# Patient Record
Sex: FEMALE | Race: BLACK OR AFRICAN AMERICAN | Employment: UNEMPLOYED | URBAN - METROPOLITAN AREA
[De-identification: names, ages, dates, MRNs, and addresses within clinical notes are randomized per-mention and may not be internally consistent; named-entity substitution may affect disease eponyms.]

---

## 2017-02-16 ENCOUNTER — HOSPITAL ENCOUNTER (EMERGENCY)
Age: 42
Discharge: HOME | End: 2017-02-16
Attending: EMERGENCY MEDICINE
Payer: MEDICAID

## 2017-02-16 VITALS
TEMPERATURE: 98.2 F | OXYGEN SATURATION: 100 % | BODY MASS INDEX: 19.37 KG/M2 | RESPIRATION RATE: 16 BRPM | HEIGHT: 72 IN | DIASTOLIC BLOOD PRESSURE: 101 MMHG | HEART RATE: 60 BPM | WEIGHT: 143 LBS | SYSTOLIC BLOOD PRESSURE: 156 MMHG

## 2017-02-16 DIAGNOSIS — G89.18 ACUTE POST-OPERATIVE PAIN: Primary | ICD-10-CM

## 2017-02-16 DIAGNOSIS — Z51.89 VISIT FOR WOUND CHECK: ICD-10-CM

## 2017-02-16 PROCEDURE — 74011250637 HC RX REV CODE- 250/637: Performed by: EMERGENCY MEDICINE

## 2017-02-16 PROCEDURE — 99283 EMERGENCY DEPT VISIT LOW MDM: CPT

## 2017-02-16 PROCEDURE — 96372 THER/PROPH/DIAG INJ SC/IM: CPT

## 2017-02-16 PROCEDURE — 74011250636 HC RX REV CODE- 250/636: Performed by: EMERGENCY MEDICINE

## 2017-02-16 RX ORDER — PROMETHAZINE HYDROCHLORIDE 25 MG/1
25 TABLET ORAL
COMMUNITY
End: 2018-07-23

## 2017-02-16 RX ORDER — OXYCODONE AND ACETAMINOPHEN 10; 325 MG/1; MG/1
TABLET ORAL
Qty: 25 TAB | Refills: 0 | Status: SHIPPED | OUTPATIENT
Start: 2017-02-16 | End: 2018-07-23

## 2017-02-16 RX ORDER — ONDANSETRON 4 MG/1
4 TABLET, ORALLY DISINTEGRATING ORAL
Status: COMPLETED | OUTPATIENT
Start: 2017-02-16 | End: 2017-02-16

## 2017-02-16 RX ORDER — HYDROMORPHONE HYDROCHLORIDE 1 MG/ML
1 INJECTION, SOLUTION INTRAMUSCULAR; INTRAVENOUS; SUBCUTANEOUS
Status: COMPLETED | OUTPATIENT
Start: 2017-02-16 | End: 2017-02-16

## 2017-02-16 RX ORDER — OXYCODONE AND ACETAMINOPHEN 5; 325 MG/1; MG/1
2 TABLET ORAL
COMMUNITY
End: 2017-02-16

## 2017-02-16 RX ADMIN — HYDROMORPHONE HYDROCHLORIDE 1 MG: 1 INJECTION, SOLUTION INTRAMUSCULAR; INTRAVENOUS; SUBCUTANEOUS at 03:13

## 2017-02-16 RX ADMIN — ONDANSETRON 4 MG: 4 TABLET, ORALLY DISINTEGRATING ORAL at 03:13

## 2017-02-16 NOTE — ED NOTES
I have reviewed discharge instructions with the patient. The patient verbalized understanding. Patient armband removed and shredded  Reviewed and verified discharge instructions with patient, discharged ambulatory on steady gait in with improved pain with father driving patient home.

## 2017-02-16 NOTE — ED PROVIDER NOTES
HPI Comments: 3:01 AM   Porsha Hayden is a 37 y.o. female presenting to the ED C/O post op complication from fusion surgery on her left third digit yesterday afternoon. Surgery was done by Dr. Herminia Gilbert and he prescribed her Percocet for pain control. Pt reports the Percocet is not helping with her pain tonight. This is the second surgery on her left third digit. Pt denies any other Sx or complaints. Patient is a 37 y.o. female presenting with post-operative complications. The history is provided by the patient. No  was used. Post OP Complication   This is a new problem. The current episode started yesterday. The problem occurs constantly. The problem has not changed since onset. Treatments tried: Percocet. The treatment provided no relief. Written by ONOFRE Markham, as dictated by Nikhil Kiser. Ochoa Shelby MD    Past Medical History:   Diagnosis Date    Ectopic pregnancy        Past Surgical History:   Procedure Laterality Date    Hx gyn       eptopic    Hx other surgical       foot         History reviewed. No pertinent family history. Social History     Social History    Marital status: SINGLE     Spouse name: N/A    Number of children: N/A    Years of education: N/A     Occupational History    Not on file. Social History Main Topics    Smoking status: Current Every Day Smoker     Packs/day: 0.25    Smokeless tobacco: Not on file    Alcohol use No    Drug use: No    Sexual activity: Not on file     Other Topics Concern    Not on file     Social History Narrative         ALLERGIES: Naproxen; Pcn [penicillins]; and Tramadol    Review of Systems   Musculoskeletal: Positive for arthralgias (Left third digit pain) and myalgias (Left third digit pain). All other systems reviewed and are negative.       Vitals:    02/16/17 0223   BP: (!) 156/101   Pulse: 60   Resp: 16   Temp: 98.2 °F (36.8 °C)   SpO2: 100%   Weight: 64.9 kg (143 lb)   Height: 6' (1.829 m) Physical Exam   Constitutional: She is oriented to person, place, and time. She appears well-developed and well-nourished. Non-toxic appearance. No distress. Uncomfortable appearing   HENT:   Head: Normocephalic and atraumatic. Right Ear: External ear normal.   Left Ear: External ear normal.   Mouth/Throat: Oropharynx is clear and moist. No oropharyngeal exudate. Eyes: Conjunctivae and EOM are normal. Pupils are equal, round, and reactive to light. No scleral icterus. No pallor   Neck: Normal range of motion. Neck supple. No JVD present. No tracheal deviation present. No thyromegaly present. Cardiovascular: Normal rate, regular rhythm and normal heart sounds. Pulmonary/Chest: Effort normal and breath sounds normal. No stridor. No respiratory distress. Abdominal: Soft. Bowel sounds are normal. She exhibits no distension. There is no tenderness. There is no rebound and no guarding. Musculoskeletal: Normal range of motion. She exhibits no edema or tenderness. No soft tissue injuries   Lymphadenopathy:     She has no cervical adenopathy. Neurological: She is alert and oriented to person, place, and time. She has normal reflexes. No cranial nerve deficit. Coordination normal.   Skin: Skin is warm and dry. No rash noted. She is not diaphoretic. No erythema. Tattoos on face. Left third digit  with edema but without erythema. No purulence. No bleeding. Good perfusion. Cap refill is instant. Sutures intact. Psychiatric: She has a normal mood and affect. Her behavior is normal. Judgment and thought content normal.   Nursing note and vitals reviewed. RESULTS:    No orders to display        Labs Reviewed - No data to display    No results found for this or any previous visit (from the past 12 hour(s)). MDM  Number of Diagnoses or Management Options  Acute post-operative pain:   Visit for wound check:   Diagnosis management comments: DDx: Doubtful for overt bleeding.  Greatest concern is for new ischemic event. Wound check reveals a healthy appearing post op finger, probably wrapped too tightly as she had immediate relief after removing her dressing      ED Course     Medications   HYDROmorphone (PF) (DILAUDID) injection 1 mg (1 mg IntraMUSCular Given 2/16/17 0313)   ondansetron (ZOFRAN ODT) tablet 4 mg (4 mg Oral Given 2/16/17 0313)      Procedures  PROGRESS NOTE:  3:01 AM  Initial assessment performed. Written by Regan Eisenmenger, ED Scribe, as dictated by Diya Monsalve. Ramirez Cortez MD     DISCHARGE NOTE:  3:58 AM   Maribell Krishna  results have been reviewed with her. She has been counseled regarding her diagnosis, treatment, and plan. She verbally conveys understanding and agreement of the signs, symptoms, diagnosis, treatment and prognosis and additionally agrees to follow up as discussed. She also agrees with the care-plan and conveys that all of her questions have been answered. I have also provided discharge instructions for her that include: educational information regarding their diagnosis and treatment, and list of reasons why they would want to return to the ED prior to their follow-up appointment, should her condition change. The patient and/or family has been provided with education for proper Emergency Department utilization. CLINICAL IMPRESSION:    1. Acute post-operative pain    2.  Visit for wound check        PLAN: DISCHARGE HOME    Follow-up Information     Follow up With Details Comments [address] Schedule an appointment as soon as possible for a visit in 1 day [facility] [address]  [facility] [address], [place] Schedule an appointment as soon as possible for a visit in 2 days Follow up with your primary care physician [address]      THE [facility] EMERGENCY DEPT Go to As needed, If symptoms worsen [address] [id number]  [phone] Current Discharge Medication List      CONTINUE these medications which have NOT CHANGED    Details   oxyCODONE-acetaminophen (PERCOCET) 5-325 mg per tablet Take 2 Tabs by mouth every four (4) hours as needed for Pain. promethazine (PHENERGAN) 25 mg tablet Take 25 mg by mouth every six (6) hours as needed for Nausea. ATTESTATIONS:  This note is prepared by Dylon Orourke, acting as Scribe for Ivy Brambila. Hayden Delgado MD .    Ivy Brambila. Hayden Delgado MD : The scribe's documentation has been prepared under my direction and personally reviewed by me in its entirety. I confirm that the note above accurately reflects all work, treatment, procedures, and medical decision making performed by me.

## 2017-02-16 NOTE — ED NOTES
Pt with surgery on Monday to have her left middle finger fused, dressing in dry and intact, no swelling to left hand, no redness extending from left middle finger, pt is having pain that is not controlled by prescribed Percocet.

## 2017-02-16 NOTE — ED TRIAGE NOTES
States had fusion surgery on left 3rd finger today at [facility] by Dr. Teetee Guerrero, states pain unbearable tonight, Percocet not controlling pain.

## 2017-02-16 NOTE — DISCHARGE INSTRUCTIONS
Acute Pain After Surgery: Care Instructions  Your Care Instructions  It's common to have some pain after surgery. Pain doesn't mean that something is wrong or that the surgery didn't go well. But when the pain is severe, it's important to work with your doctor to manage it. It's also important to be aware of a few facts about pain and pain medicine. · You are the only person who knows what your pain feels like. So be sure to tell your doctor when you are in pain or when the pain changes. Then he or she will know how to adjust your medicines. · Pain is often easier to control right after it starts. So it may be better to take regular doses of pain medicine and not wait until the pain gets bad. · Medicine can help control pain. But this doesn't mean you'll have no pain. Medicine works to keep the pain at a level you can live with. With time, you will feel better. Follow-up care is a key part of your treatment and safety. Be sure to make and go to all appointments, and call your doctor if you are having problems. It's also a good idea to know your test results and keep a list of the medicines you take. How can you care for yourself at home? · Be safe with medicines. Read and follow all instructions on the label. ¨ If the doctor gave you a prescription medicine for pain, take it as prescribed. ¨ If you are not taking a prescription pain medicine, ask your doctor if you can take an over-the-counter medicine. · If you take an over-the-counter pain medicine, such as acetaminophen (Tylenol), ibuprofen (Advil, Motrin), or naproxen (Aleve), read and follow all instructions on the label. · Do not take two or more pain medicines at the same time unless the doctor told you to. · Do not drink alcohol while you are taking pain medicines. · Try to walk each day if your doctor recommends it. Start by walking a little more than you did the day before. Bit by bit, increase the amount you walk.  Walking increases blood flow. It also helps prevent pneumonia and constipation. · To prevent constipation from narcotic pain medicines:  ¨ Talk to your doctor about a laxative. ¨ Include fruits, vegetables, beans, and whole grains in your diet each day. These foods are high in fiber. ¨ Drink plenty of fluids, enough so that your urine is light yellow or clear like water. Drink water, fruit juice, or other drinks that do not contain caffeine or alcohol. If you have kidney, heart, or liver disease and have to limit fluids, talk with your doctor before you increase the amount of fluids you drink. ¨ Take a fiber supplement, such as Citrucel or Metamucil, every day if needed. Read and follow all instructions on the label. If you take pain medicine for more than a few days, talk to your doctor before you take fiber. When should you call for help? Call your doctor now or seek immediate medical care if:  · Your pain gets worse. · Your pain is not controlled by medicine. Watch closely for changes in your health, and be sure to contact your doctor if you have any problems. Where can you learn more? Go to http://santino-nissa.info/. Enter (70) 474-076 in the search box to learn more about \"Acute Pain After Surgery: Care Instructions. \"  Current as of: May 27, 2016  Content Version: 11.1  © 7336-8569 Healthwise, Incorporated. Care instructions adapted under license by [employer] (which disclaims liability or warranty for this information). If you have questions about a medical condition or this instruction, always ask your healthcare professional. [place] [address] any warranty or liability for your use of this information.

## 2018-07-23 ENCOUNTER — HOSPITAL ENCOUNTER (EMERGENCY)
Age: 43
Discharge: HOME | End: 2018-07-23
Attending: EMERGENCY MEDICINE
Payer: MEDICAID

## 2018-07-23 VITALS
BODY MASS INDEX: 20.32 KG/M2 | TEMPERATURE: 98 F | HEIGHT: 72 IN | OXYGEN SATURATION: 100 % | WEIGHT: 150 LBS | SYSTOLIC BLOOD PRESSURE: 110 MMHG | DIASTOLIC BLOOD PRESSURE: 93 MMHG | HEART RATE: 82 BPM | RESPIRATION RATE: 18 BRPM

## 2018-07-23 DIAGNOSIS — H00.012 HORDEOLUM EXTERNUM OF RIGHT LOWER EYELID: Primary | ICD-10-CM

## 2018-07-23 PROCEDURE — 74011000250 HC RX REV CODE- 250: Performed by: EMERGENCY MEDICINE

## 2018-07-23 PROCEDURE — 99283 EMERGENCY DEPT VISIT LOW MDM: CPT

## 2018-07-23 RX ORDER — TETRACAINE HYDROCHLORIDE 5 MG/ML
2 SOLUTION OPHTHALMIC
Status: COMPLETED | OUTPATIENT
Start: 2018-07-23 | End: 2018-07-23

## 2018-07-23 RX ORDER — DOXYCYCLINE 100 MG/1
100 CAPSULE ORAL 2 TIMES DAILY
Qty: 14 CAP | Refills: 0 | Status: SHIPPED | OUTPATIENT
Start: 2018-07-23 | End: 2018-07-30

## 2018-07-23 RX ORDER — HYDROCODONE BITARTRATE AND ACETAMINOPHEN 5; 325 MG/1; MG/1
TABLET ORAL
Qty: 8 TAB | Refills: 0 | Status: SHIPPED | OUTPATIENT
Start: 2018-07-23 | End: 2019-09-30

## 2018-07-23 RX ADMIN — TETRACAINE HYDROCHLORIDE 2 DROP: 5 SOLUTION OPHTHALMIC at 03:46

## 2018-07-23 NOTE — ED NOTES
Pt presents to ED saying, \"I have a stye. \"  This RN noticing a small redden spot on her bottom eyelid, slight swelling, no drng. Pt saying the area is painful. No other complaints being voiced.

## 2018-07-23 NOTE — DISCHARGE INSTRUCTIONS
Styes and Chalazia: Care Instructions  Your Care Instructions    Styes and chalazia (say \"oso-LTN-fgg-uh\") are both conditions that can cause swelling of the eyelid. A stye is an infection in the root of an eyelash. The infection causes a tender red lump on the edge of the eyelid. The infection can spread until the whole eyelid becomes red and inflamed. Styes usually break open, and a tiny amount of pus drains. They usually clear up on their own in about a week, but they sometimes need treatment with antibiotics. A chalazion is a lump or cyst in the eyelid (chalazion is singular; chalazia is plural). It is caused by swelling and inflammation of deep oil glands inside the eyelid. Chalazia are usually not infected. They can take a few months to heal.  If a chalazion becomes more swollen and painful or does not go away, you may need to have it drained by your doctor. Follow-up care is a key part of your treatment and safety. Be sure to make and go to all appointments, and call your doctor if you are having problems. It's also a good idea to know your test results and keep a list of the medicines you take. How can you care for yourself at home? · Do not rub your eyes. Do not squeeze or try to open a stye or chalazion. · To help a stye or chalazion heal faster:  ¨ Put a warm, moist compress on your eye for 5 to 10 minutes, 3 to 6 times a day. Heat often brings a stye to a point where it drains on its own. Keep in mind that warm compresses will often increase swelling a little at first.  ¨ Do not use hot water or heat a wet cloth in a microwave oven. The compress may get too hot and can burn the eyelid. · Always wash your hands before and after you use a compress or touch your eyes. · If the doctor gave you antibiotic drops or ointment, use the medicine exactly as directed. Use the medicine for as long as instructed, even if your eye starts to feel better.   · To put in eyedrops or ointment:  ¨ Tilt your head back, and pull your lower eyelid down with one finger. ¨ Drop or squirt the medicine inside the lower lid. ¨ Close your eye for 30 to 60 seconds to let the drops or ointment move around. ¨ Do not touch the ointment or dropper tip to your eyelashes or any other surface. · Do not wear eye makeup or contact lenses until the stye or chalazion heals. · Do not share towels, pillows, or washcloths while you have a stye. When should you call for help? Call your doctor now or seek immediate medical care if:    · You have pain in your eye.     · You have a change in vision or loss of vision.     · Redness and swelling get much worse.    Watch closely for changes in your health, and be sure to contact your doctor if:    · Your stye does not get better in 1 week.     · Your chalazion does not start to get better after several weeks. Where can you learn more? Go to http://santino-nissa.info/. Enter Z237 in the search box to learn more about \"Styes and Chalazia: Care Instructions. \"  Current as of: December 3, 2017  Content Version: 11.7  © 0747-4567 [employer]. Care instructions adapted under license by [employer] (which disclaims liability or warranty for this information). If you have questions about a medical condition or this instruction, always ask your healthcare professional. [address] any warranty or liability for your use of this information.

## 2018-07-23 NOTE — ED TRIAGE NOTES
Pt reports she has a stye on her right eyelid that started 1 day ago. Pt states that she has tried tylenol, motrin with no relief from pain.

## 2018-07-23 NOTE — ED NOTES
Adm of eye drops into RT eye per orders to aide pt's pain/discomfort. Pt being d/c home with d/c instructions/Rx reviewed. Understanding acknowledged by pt. NAD observed. Pt ambulatory.

## 2018-07-23 NOTE — ED PROVIDER NOTES
EMERGENCY DEPARTMENT HISTORY AND PHYSICAL EXAM    Date: 7/23/2018  Patient Name: Donn Patel    History of Presenting Illness     Chief Complaint   Patient presents with    Stye         History Provided By: Patient    Chief Complaint: bump to the lash line of her right lower eyelid   Duration: 2 Days  Timing:  worsening  Location: lash line of her right lower eyelid   Quality: painful, erythematous  Modifying Factors: Tylenol and Motrin without relief  Associated Symptoms: denies any other associated signs or symptoms    Additional History (Context):   3:32 AM   Donn Patel is a 44 y.o. female who presents to the emergency department C/O erythematous and painful bump to the lash line of her right lower eyelid starting 2 day ago and worsening yesterday. Pt has taken Tylenol and Motrin without relief. Pt denies visual changes, eye pain, eye discharge, and any other sxs or complaints. PCP: PROVIDER UNKNOWN        Past History     Past Medical History:  Past Medical History:   Diagnosis Date    Ectopic pregnancy        Past Surgical History:  Past Surgical History:   Procedure Laterality Date    HX GYN      eptopic    HX OTHER SURGICAL      foot       Family History:  History reviewed. No pertinent family history. Social History:  Social History   Substance Use Topics    Smoking status: Current Every Day Smoker     Packs/day: 0.25    Smokeless tobacco: None    Alcohol use No       Allergies: Allergies   Allergen Reactions    Naproxen Hives    Pcn [Penicillins] Hives    Tramadol Hives         Review of Systems   Review of Systems   Eyes: Negative for pain, discharge and visual disturbance. Skin: Positive for color change (erythematous bump to right lower eyelid). All other systems reviewed and are negative.       Physical Exam     Vitals:    07/23/18 0257   BP: (!) 110/93   Pulse: 82   Resp: 18   Temp: 98.5 °F (36.9 °C)   SpO2: 100%   Weight: 68 kg (150 lb)   Height: 6' (1.829 m)     Physical Exam   Constitutional: She is oriented to person, place, and time. She appears well-developed and well-nourished. HENT:   Head: Normocephalic and atraumatic. Eyes: Pupils are equal, round, and reactive to light. Right lower eyelid is edematous. I am unable to see a stye. Globe appears intact. Pt is not compliant with the eye exam, stating \"It hurts too much\". Neck: Neck supple. Pulmonary/Chest: Effort normal and breath sounds normal. No respiratory distress. Abdominal: She exhibits no distension. Neurological: She is alert and oriented to person, place, and time. No cranial nerve deficit. Skin: No rash noted. Psychiatric: She has a normal mood and affect. Her behavior is normal. Thought content normal.   Nursing note and vitals reviewed. Diagnostic Study Results     Labs -   No results found for this or any previous visit (from the past 12 hour(s)). Radiologic Studies -   No orders to display     Medications given in the ED-  Medications   tetracaine HCl (PF) (PONTOCAINE) 0.5 % ophthalmic solution 2 Drop (not administered)         Medical Decision Making   I am the first provider for this patient. I reviewed the vital signs, available nursing notes, past medical history, past surgical history, family history and social history. Vital Signs-Reviewed the patient's vital signs. Pulse Oximetry Analysis - 100% on room air     Records Reviewed: Nursing Notes    Procedures:  Procedures    ED Course:   3:32 AM  Initial assessment performed. The patients presenting problems have been discussed, and they are in agreement with the care plan formulated and outlined with them. I have encouraged them to ask questions as they arise throughout their visit. Diagnosis and Disposition       DISCHARGE NOTE:  3:36 AM   Darcy Howard  results have been reviewed with her. She has been counseled regarding her diagnosis, treatment, and plan.   She verbally conveys understanding and agreement of the signs, symptoms, diagnosis, treatment and prognosis and additionally agrees to follow up as discussed. She also agrees with the care-plan and conveys that all of her questions have been answered. I have also provided discharge instructions for her that include: educational information regarding their diagnosis and treatment, and list of reasons why they would want to return to the ED prior to their follow-up appointment, should her condition change. She has been provided with education for proper emergency department utilization. CLINICAL IMPRESSION:    1. Hordeolum externum of right lower eyelid        PLAN:  1. D/C Home  2. Current Discharge Medication List      START taking these medications    Details   doxycycline (MONODOX) 100 mg capsule Take 1 Cap by mouth two (2) times a day for 7 days. Qty: 14 Cap, Refills: 0      HYDROcodone-acetaminophen (NORCO) 5-325 mg per tablet Take 1-2 tablets PO every 4-6 hours as needed for pain control. If over the counter ibuprofen or acetaminophen was suggested, then only take the vicodin for pain not well controlled with the over the counter medication. Qty: 8 Tab, Refills: 0    Associated Diagnoses: Hordeolum externum of right lower eyelid           3. Follow-up Information     Follow up With Details Comments Contact Info    Kody Mclaughlin MD Schedule an appointment as soon as possible for a visit in 2 days For opthalmology follow up [address] [facility] EMERGENCY DEPT  As needed, If symptoms worsen [address] [facility] [address]  [phone]        _______________________________    Attestations: This note is prepared by Edu Vickers, acting as Scribe for [employer], MD.    [employer], MD:  The scribe's documentation has been prepared under my direction and personally reviewed by me in its entirety.   I confirm that the note above accurately reflects all work, treatment, procedures, and medical decision making performed by me.  _______________________________

## 2019-09-30 ENCOUNTER — HOSPITAL ENCOUNTER (EMERGENCY)
Age: 44
Discharge: HOME | End: 2019-09-30
Attending: EMERGENCY MEDICINE
Payer: MEDICAID

## 2019-09-30 VITALS
DIASTOLIC BLOOD PRESSURE: 79 MMHG | OXYGEN SATURATION: 98 % | HEART RATE: 56 BPM | TEMPERATURE: 98.2 F | SYSTOLIC BLOOD PRESSURE: 118 MMHG | WEIGHT: 152 LBS | BODY MASS INDEX: 20.59 KG/M2 | HEIGHT: 72 IN | RESPIRATION RATE: 18 BRPM

## 2019-09-30 DIAGNOSIS — H00.014 HORDEOLUM EXTERNUM OF LEFT UPPER EYELID: ICD-10-CM

## 2019-09-30 DIAGNOSIS — H00.012 HORDEOLUM EXTERNUM OF RIGHT LOWER EYELID: Primary | ICD-10-CM

## 2019-09-30 PROCEDURE — 99282 EMERGENCY DEPT VISIT SF MDM: CPT

## 2019-09-30 RX ORDER — DOXYCYCLINE HYCLATE 100 MG
100 TABLET ORAL 2 TIMES DAILY
Qty: 20 TAB | Refills: 0 | Status: SHIPPED | OUTPATIENT
Start: 2019-09-30 | End: 2019-10-10

## 2019-09-30 NOTE — ED PROVIDER NOTES
EMERGENCY DEPARTMENT HISTORY AND PHYSICAL EXAM    Date: 9/30/2019  Patient Name: Lizabeth Contreras    History of Presenting Illness     Chief Complaint   Patient presents with    Eye Pain         History Provided By: Patient    Chief Complaint: eyelid swelling    HPI(Context):   2:12 PM  Lizabeth Contreras is a 38 y.o. female who presents to the emergency department C/O eyelid swelling. Associated sxs include eyelid pain. Pt reports swelling to upper left eyelid and right lower eyelid. Sxs x 1-2 days after using a new facial soap. Pt has hx of stye but reports many years since this occurred. Pt denies fever, chills, eye drainage, globe involvement, and any other sxs or complaints. Pt is active smoker    PCP: None    Current Outpatient Medications   Medication Sig Dispense Refill    doxycycline (VIBRA-TABS) 100 mg tablet Take 1 Tab by mouth two (2) times a day for 10 days. Indications: an infection of the skin and the tissue below the skin 20 Tab 0       Past History     Past Medical History:  Past Medical History:   Diagnosis Date    Ectopic pregnancy        Past Surgical History:  Past Surgical History:   Procedure Laterality Date    HX GYN      eptopic    HX OTHER SURGICAL      foot       Family History:  History reviewed. No pertinent family history. Social History:  Social History     Tobacco Use    Smoking status: Current Every Day Smoker     Packs/day: 0.25    Smokeless tobacco: Never Used   Substance Use Topics    Alcohol use: No    Drug use: No       Allergies: Allergies   Allergen Reactions    Naproxen Hives    Pcn [Penicillins] Hives    Tramadol Hives         Review of Systems   Review of Systems   Constitutional: Negative for fever. HENT: Positive for facial swelling. Negative for congestion. Skin: Positive for color change. All other systems reviewed and are negative.       Physical Exam     Vitals:    09/30/19 1413   BP: 118/79   Pulse: (!) 56   Resp: 18   Temp: 98.2 °F (36.8 °C) SpO2: 98%   Weight: 68.9 kg (152 lb)   Height: 6' (1.829 m)     Physical Exam   Constitutional: She is oriented to person, place, and time. She appears well-developed and well-nourished. No distress. AA female in AND. Alert. Appears comfortable. HENT:   Head: Normocephalic and atraumatic. Head is without right periorbital erythema and without left periorbital erythema. Right Ear: Hearing and external ear normal.   Left Ear: Hearing and external ear normal.   Nose: Nose normal.   Mouth/Throat: Uvula is midline, oropharynx is clear and moist and mucous membranes are normal.   Eyes: Pupils are equal, round, and reactive to light. Conjunctivae and EOM are normal. Lids are everted and swept, no foreign bodies found. Right eye exhibits hordeolum (right lower eyelid). Right eye exhibits no chemosis, no discharge and no exudate. No foreign body present in the right eye. Left eye exhibits hordeolum (left upper eyelid). Left eye exhibits no chemosis, no discharge and no exudate. No foreign body present in the left eye. Right conjunctiva is not injected. Right conjunctiva has no hemorrhage. Left conjunctiva is not injected. Left conjunctiva has no hemorrhage. Neck: Normal range of motion. Cardiovascular: Normal rate, regular rhythm, normal heart sounds and intact distal pulses. Pulmonary/Chest: Effort normal and breath sounds normal.   Musculoskeletal: Normal range of motion. Lymphadenopathy:        Head (right side): No preauricular and no posterior auricular adenopathy present. Head (left side): No preauricular and no posterior auricular adenopathy present. Neurological: She is alert and oriented to person, place, and time. Skin: Skin is warm and dry. She is not diaphoretic. Psychiatric: She has a normal mood and affect. Judgment normal.   Nursing note and vitals reviewed.           Diagnostic Study Results     Labs -   No results found for this or any previous visit (from the past 12 hour(s)). No orders to display     CT Results  (Last 48 hours)    None        CXR Results  (Last 48 hours)    None          Medications given in the ED-  Medications - No data to display      Medical Decision Making   I am the first provider for this patient. I reviewed the vital signs, available nursing notes, past medical history, past surgical history, family history and social history. Vital Signs-Reviewed the patient's vital signs. Pulse Oximetry Analysis - 98% on RA    Records Reviewed: Nursing Notes    Provider Notes (Medical Decision Making):  Stye, chalazion, cellulitis, contact dermatitis    Procedures:  Procedures    ED Course:   2:12 PM Initial assessment performed. The patients presenting problems have been discussed, and they are in agreement with the care plan formulated and outlined with them. I have encouraged them to ask questions as they arise throughout their visit. Diagnosis and Disposition       Will tx for stye. Warm compresses. Allergy to PCN so will Rx doxy. Reasons to RTED discussed with pt. All questions answered. Pt feels comfortable going home at this time. Pt expressed understanding and she agrees with plan. 1. Hordeolum externum of right lower eyelid    2. Hordeolum externum of left upper eyelid        PLAN:  1. D/C Home  2. Discharge Medication List as of 9/30/2019  2:51 PM      START taking these medications    Details   doxycycline (VIBRA-TABS) 100 mg tablet Take 1 Tab by mouth two (2) times a day for 10 days. Indications: an infection of the skin and the tissue below the skin, Print, Disp-20 Tab, R-0           3.    Follow-up Information     Follow up With Specialties Details Why Contact Info    Sumanth Montejo MD Ophthalmology   [address] [facility]  [phone]      [facility] EMERGENCY DEPT Emergency Medicine  As needed, If symptoms worsen [address] [place] [address] [phone] _______________________________    Attestations: This note is prepared by Joan Alcaraz PA-C.  _______________________________          Please note that this dictation was completed with [employer], the computer voice recognition software. Quite often unanticipated grammatical, syntax, homophones, and other interpretive errors are inadvertently transcribed by the computer software. Please disregard these errors. Please excuse any errors that have escaped final proofreading.

## 2019-09-30 NOTE — ED TRIAGE NOTES
Triage: pt with swelling to bilateral eyelids. Visible blister-like area to R interior lower lid. Pt denies any injury or known foreign body. States that she used a new facial soap a few days ago.

## 2021-02-16 ENCOUNTER — HOSPITAL ENCOUNTER (INPATIENT)
Age: 46
LOS: 3 days | Discharge: HOME | DRG: 263 | End: 2021-02-19
Attending: EMERGENCY MEDICINE | Admitting: SURGERY
Payer: MEDICAID

## 2021-02-16 ENCOUNTER — APPOINTMENT (OUTPATIENT)
Dept: CT IMAGING | Age: 46
DRG: 263 | End: 2021-02-16
Attending: EMERGENCY MEDICINE
Payer: MEDICAID

## 2021-02-16 DIAGNOSIS — K81.0 ACUTE CHOLECYSTITIS: Primary | ICD-10-CM

## 2021-02-16 PROBLEM — K81.9 CHOLECYSTITIS: Status: ACTIVE | Noted: 2021-02-16

## 2021-02-16 LAB
ALBUMIN SERPL-MCNC: 3.4 G/DL (ref 3.4–5)
ALBUMIN/GLOB SERPL: 0.8 {RATIO} (ref 0.8–1.7)
ALP SERPL-CCNC: 99 U/L (ref 45–117)
ALT SERPL-CCNC: 23 U/L (ref 13–56)
ANION GAP SERPL CALC-SCNC: 6 MMOL/L (ref 3–18)
APPEARANCE UR: CLEAR
AST SERPL-CCNC: 16 U/L (ref 10–38)
BASOPHILS # BLD: 0 K/UL (ref 0–0.1)
BASOPHILS NFR BLD: 0 % (ref 0–2)
BILIRUB SERPL-MCNC: 0.2 MG/DL (ref 0.2–1)
BILIRUB UR QL: NEGATIVE
BUN SERPL-MCNC: 8 MG/DL (ref 7–18)
BUN/CREAT SERPL: 10 (ref 12–20)
CALCIUM SERPL-MCNC: 8.5 MG/DL (ref 8.5–10.1)
CHLORIDE SERPL-SCNC: 106 MMOL/L (ref 100–111)
CO2 SERPL-SCNC: 30 MMOL/L (ref 21–32)
COLOR UR: YELLOW
CREAT SERPL-MCNC: 0.78 MG/DL (ref 0.6–1.3)
DIFFERENTIAL METHOD BLD: ABNORMAL
EOSINOPHIL # BLD: 0.2 K/UL (ref 0–0.4)
EOSINOPHIL NFR BLD: 2 % (ref 0–5)
ERYTHROCYTE [DISTWIDTH] IN BLOOD BY AUTOMATED COUNT: 15.6 % (ref 11.6–14.5)
GLOBULIN SER CALC-MCNC: 4.3 G/DL (ref 2–4)
GLUCOSE SERPL-MCNC: 107 MG/DL (ref 74–99)
GLUCOSE UR STRIP.AUTO-MCNC: NEGATIVE MG/DL
HCG SERPL QL: NEGATIVE
HCT VFR BLD AUTO: 31.8 % (ref 35–45)
HGB BLD-MCNC: 10.4 G/DL (ref 12–16)
HGB UR QL STRIP: NEGATIVE
KETONES UR QL STRIP.AUTO: NEGATIVE MG/DL
LEUKOCYTE ESTERASE UR QL STRIP.AUTO: NEGATIVE
LIPASE SERPL-CCNC: 36 U/L (ref 73–393)
LYMPHOCYTES # BLD: 2.4 K/UL (ref 0.9–3.6)
LYMPHOCYTES NFR BLD: 31 % (ref 21–52)
MCH RBC QN AUTO: 26.7 PG (ref 24–34)
MCHC RBC AUTO-ENTMCNC: 32.7 G/DL (ref 31–37)
MCV RBC AUTO: 81.5 FL (ref 74–97)
MONOCYTES # BLD: 0.6 K/UL (ref 0.05–1.2)
MONOCYTES NFR BLD: 8 % (ref 3–10)
NEUTS SEG # BLD: 4.6 K/UL (ref 1.8–8)
NEUTS SEG NFR BLD: 59 % (ref 40–73)
NITRITE UR QL STRIP.AUTO: NEGATIVE
PH UR STRIP: 6.5 [PH] (ref 5–8)
PLATELET # BLD AUTO: 333 K/UL (ref 135–420)
PMV BLD AUTO: 9.2 FL (ref 9.2–11.8)
POTASSIUM SERPL-SCNC: 3.4 MMOL/L (ref 3.5–5.5)
PROT SERPL-MCNC: 7.7 G/DL (ref 6.4–8.2)
PROT UR STRIP-MCNC: NEGATIVE MG/DL
RBC # BLD AUTO: 3.9 M/UL (ref 4.2–5.3)
SODIUM SERPL-SCNC: 142 MMOL/L (ref 136–145)
SP GR UR REFRACTOMETRY: 1.03 (ref 1–1.03)
UROBILINOGEN UR QL STRIP.AUTO: 0.2 EU/DL (ref 0.2–1)
WBC # BLD AUTO: 7.8 K/UL (ref 4.6–13.2)

## 2021-02-16 PROCEDURE — 99284 EMERGENCY DEPT VISIT MOD MDM: CPT

## 2021-02-16 PROCEDURE — 74011000250 HC RX REV CODE- 250: Performed by: EMERGENCY MEDICINE

## 2021-02-16 PROCEDURE — 74011250636 HC RX REV CODE- 250/636: Performed by: SURGERY

## 2021-02-16 PROCEDURE — 74177 CT ABD & PELVIS W/CONTRAST: CPT

## 2021-02-16 PROCEDURE — 80053 COMPREHEN METABOLIC PANEL: CPT

## 2021-02-16 PROCEDURE — 96374 THER/PROPH/DIAG INJ IV PUSH: CPT

## 2021-02-16 PROCEDURE — 74011250637 HC RX REV CODE- 250/637: Performed by: EMERGENCY MEDICINE

## 2021-02-16 PROCEDURE — 81003 URINALYSIS AUTO W/O SCOPE: CPT

## 2021-02-16 PROCEDURE — 74011250637 HC RX REV CODE- 250/637: Performed by: SURGERY

## 2021-02-16 PROCEDURE — 77030040361 HC SLV COMPR DVT MDII -B

## 2021-02-16 PROCEDURE — 85025 COMPLETE CBC W/AUTO DIFF WBC: CPT

## 2021-02-16 PROCEDURE — 74011250636 HC RX REV CODE- 250/636: Performed by: EMERGENCY MEDICINE

## 2021-02-16 PROCEDURE — 83690 ASSAY OF LIPASE: CPT

## 2021-02-16 PROCEDURE — 96375 TX/PRO/DX INJ NEW DRUG ADDON: CPT

## 2021-02-16 PROCEDURE — 74011000636 HC RX REV CODE- 636: Performed by: EMERGENCY MEDICINE

## 2021-02-16 PROCEDURE — 84703 CHORIONIC GONADOTROPIN ASSAY: CPT

## 2021-02-16 PROCEDURE — 65270000029 HC RM PRIVATE

## 2021-02-16 RX ORDER — DICYCLOMINE HYDROCHLORIDE 10 MG/1
20 CAPSULE ORAL ONCE
Status: COMPLETED | OUTPATIENT
Start: 2021-02-16 | End: 2021-02-16

## 2021-02-16 RX ORDER — HYDROMORPHONE HYDROCHLORIDE 1 MG/ML
1 INJECTION, SOLUTION INTRAMUSCULAR; INTRAVENOUS; SUBCUTANEOUS
Status: DISCONTINUED | OUTPATIENT
Start: 2021-02-16 | End: 2021-02-19 | Stop reason: HOSPADM

## 2021-02-16 RX ORDER — OXYCODONE AND ACETAMINOPHEN 5; 325 MG/1; MG/1
1 TABLET ORAL
Status: DISCONTINUED | OUTPATIENT
Start: 2021-02-16 | End: 2021-02-19 | Stop reason: HOSPADM

## 2021-02-16 RX ORDER — ONDANSETRON 2 MG/ML
4 INJECTION INTRAMUSCULAR; INTRAVENOUS
Status: DISCONTINUED | OUTPATIENT
Start: 2021-02-16 | End: 2021-02-19 | Stop reason: HOSPADM

## 2021-02-16 RX ORDER — METRONIDAZOLE 500 MG/100ML
500 INJECTION, SOLUTION INTRAVENOUS EVERY 12 HOURS
Status: DISCONTINUED | OUTPATIENT
Start: 2021-02-16 | End: 2021-02-19 | Stop reason: HOSPADM

## 2021-02-16 RX ORDER — DEXTROSE, SODIUM CHLORIDE, AND POTASSIUM CHLORIDE 5; .45; .15 G/100ML; G/100ML; G/100ML
125 INJECTION INTRAVENOUS CONTINUOUS
Status: DISCONTINUED | OUTPATIENT
Start: 2021-02-16 | End: 2021-02-19 | Stop reason: HOSPADM

## 2021-02-16 RX ORDER — CIPROFLOXACIN 2 MG/ML
400 INJECTION, SOLUTION INTRAVENOUS EVERY 12 HOURS
Status: DISCONTINUED | OUTPATIENT
Start: 2021-02-16 | End: 2021-02-19 | Stop reason: HOSPADM

## 2021-02-16 RX ORDER — DIPHENHYDRAMINE HYDROCHLORIDE 50 MG/ML
12.5 INJECTION, SOLUTION INTRAMUSCULAR; INTRAVENOUS
Status: DISCONTINUED | OUTPATIENT
Start: 2021-02-16 | End: 2021-02-19 | Stop reason: HOSPADM

## 2021-02-16 RX ORDER — DICYCLOMINE HYDROCHLORIDE 10 MG/1
10 CAPSULE ORAL
Status: DISCONTINUED | OUTPATIENT
Start: 2021-02-16 | End: 2021-02-19 | Stop reason: HOSPADM

## 2021-02-16 RX ORDER — FAMOTIDINE 10 MG/ML
20 INJECTION INTRAVENOUS
Status: DISCONTINUED | OUTPATIENT
Start: 2021-02-16 | End: 2021-02-16

## 2021-02-16 RX ORDER — FENTANYL CITRATE 50 UG/ML
50 INJECTION, SOLUTION INTRAMUSCULAR; INTRAVENOUS
Status: COMPLETED | OUTPATIENT
Start: 2021-02-16 | End: 2021-02-16

## 2021-02-16 RX ORDER — ACETAMINOPHEN 325 MG/1
650 TABLET ORAL
Status: DISCONTINUED | OUTPATIENT
Start: 2021-02-16 | End: 2021-02-18

## 2021-02-16 RX ORDER — SODIUM CHLORIDE 0.9 % (FLUSH) 0.9 %
5-40 SYRINGE (ML) INJECTION EVERY 8 HOURS
Status: DISCONTINUED | OUTPATIENT
Start: 2021-02-16 | End: 2021-02-19 | Stop reason: HOSPADM

## 2021-02-16 RX ORDER — METRONIDAZOLE 500 MG/100ML
500 INJECTION, SOLUTION INTRAVENOUS EVERY 8 HOURS
Status: DISCONTINUED | OUTPATIENT
Start: 2021-02-16 | End: 2021-02-16

## 2021-02-16 RX ORDER — SODIUM CHLORIDE 0.9 % (FLUSH) 0.9 %
5-40 SYRINGE (ML) INJECTION AS NEEDED
Status: DISCONTINUED | OUTPATIENT
Start: 2021-02-16 | End: 2021-02-19 | Stop reason: HOSPADM

## 2021-02-16 RX ORDER — POTASSIUM CHLORIDE 7.45 MG/ML
10 INJECTION INTRAVENOUS
Status: COMPLETED | OUTPATIENT
Start: 2021-02-16 | End: 2021-02-17

## 2021-02-16 RX ADMIN — ONDANSETRON 4 MG: 2 INJECTION INTRAMUSCULAR; INTRAVENOUS at 20:56

## 2021-02-16 RX ADMIN — FENTANYL CITRATE 50 MCG: 50 INJECTION, SOLUTION INTRAMUSCULAR; INTRAVENOUS at 06:29

## 2021-02-16 RX ADMIN — Medication 10 ML: at 14:26

## 2021-02-16 RX ADMIN — DICYCLOMINE HYDROCHLORIDE 10 MG: 10 CAPSULE ORAL at 17:18

## 2021-02-16 RX ADMIN — CIPROFLOXACIN 400 MG: 2 INJECTION, SOLUTION INTRAVENOUS at 11:41

## 2021-02-16 RX ADMIN — POTASSIUM CHLORIDE 10 MEQ: 10 INJECTION, SOLUTION INTRAVENOUS at 23:13

## 2021-02-16 RX ADMIN — HYDROMORPHONE HYDROCHLORIDE 1 MG: 1 INJECTION, SOLUTION INTRAMUSCULAR; INTRAVENOUS; SUBCUTANEOUS at 09:31

## 2021-02-16 RX ADMIN — IOPAMIDOL 100 ML: 612 INJECTION, SOLUTION INTRAVENOUS at 05:03

## 2021-02-16 RX ADMIN — DEXTROSE MONOHYDRATE, SODIUM CHLORIDE, AND POTASSIUM CHLORIDE 125 ML/HR: 50; 4.5; 1.49 INJECTION, SOLUTION INTRAVENOUS at 09:35

## 2021-02-16 RX ADMIN — DICYCLOMINE HYDROCHLORIDE 10 MG: 10 CAPSULE ORAL at 11:42

## 2021-02-16 RX ADMIN — HYDROMORPHONE HYDROCHLORIDE 1 MG: 1 INJECTION, SOLUTION INTRAMUSCULAR; INTRAVENOUS; SUBCUTANEOUS at 20:56

## 2021-02-16 RX ADMIN — DICYCLOMINE HYDROCHLORIDE 20 MG: 10 CAPSULE ORAL at 04:22

## 2021-02-16 RX ADMIN — SODIUM CHLORIDE 1000 ML: 900 INJECTION, SOLUTION INTRAVENOUS at 04:21

## 2021-02-16 RX ADMIN — METRONIDAZOLE 500 MG: 500 INJECTION, SOLUTION INTRAVENOUS at 22:15

## 2021-02-16 RX ADMIN — METRONIDAZOLE 500 MG: 500 INJECTION, SOLUTION INTRAVENOUS at 09:38

## 2021-02-16 RX ADMIN — OXYCODONE AND ACETAMINOPHEN 1 TABLET: 5; 325 TABLET ORAL at 23:53

## 2021-02-16 RX ADMIN — DICYCLOMINE HYDROCHLORIDE 10 MG: 10 CAPSULE ORAL at 23:15

## 2021-02-16 RX ADMIN — Medication 10 ML: at 21:00

## 2021-02-16 RX ADMIN — CIPROFLOXACIN 400 MG: 2 INJECTION, SOLUTION INTRAVENOUS at 21:00

## 2021-02-16 RX ADMIN — FAMOTIDINE 20 MG: 10 INJECTION, SOLUTION INTRAVENOUS at 04:21

## 2021-02-16 RX ADMIN — OXYCODONE AND ACETAMINOPHEN 1 TABLET: 5; 325 TABLET ORAL at 16:18

## 2021-02-16 NOTE — ED PROVIDER NOTES
EMERGENCY DEPARTMENT HISTORY AND PHYSICAL EXAM    Date: 2/16/2021  Patient Name: Edda Friedman    History of Presenting Illness     Chief Complaint   Patient presents with    Abdominal Pain         History Provided By: Patient    Additional History (Context): Edda Friedman is a 41 y.o. female with PMHX tobacco use presents to the emergency department via private vehicle C/O right-sided abdominal pain. Patient denies any radiation of her pain. Denies any nausea, vomiting or diarrhea. Denies any vaginal discharge or vaginal bleeding. States that she just finished her period. Reports a history of ectopic pregnancy. Denies fever, dysuria, and any other sxs or complaints. No relieving or exacerbating factors identified. States that she did not take anything for her pain. PCP: None        Past History     Past Medical History:  Past Medical History:   Diagnosis Date    Ectopic pregnancy        Past Surgical History:  Past Surgical History:   Procedure Laterality Date    HX GYN      eptopic    HX OTHER SURGICAL      foot       Family History:  No family history on file. Social History:  Social History     Tobacco Use    Smoking status: Current Every Day Smoker     Packs/day: 0.25    Smokeless tobacco: Never Used   Substance Use Topics    Alcohol use: No    Drug use: No       Allergies: Allergies   Allergen Reactions    Naproxen Hives    Pcn [Penicillins] Hives    Tramadol Hives         Review of Systems   Review of Systems   Constitutional: Negative for chills and fever. HENT: Negative for congestion, ear pain, sinus pain and sore throat. Eyes: Negative for pain and visual disturbance. Respiratory: Negative for cough and shortness of breath. Cardiovascular: Negative for chest pain and leg swelling. Gastrointestinal: Positive for abdominal pain. Negative for constipation, diarrhea, nausea and vomiting.    Genitourinary: Negative for dysuria, hematuria, vaginal bleeding and vaginal discharge. Musculoskeletal: Negative for back pain and neck pain. Skin: Negative for rash and wound. Neurological: Negative for dizziness, tremors, weakness, light-headedness and numbness. All other systems reviewed and are negative. Physical Exam     Vitals:    02/16/21 0353   BP: (!) 151/104   Pulse: 83   Resp: 16   Temp: 97.1 °F (36.2 °C)   SpO2: 100%   Weight: 70.8 kg (156 lb)   Height: 6' (1.829 m)     Physical Exam    Nursing note and vitals reviewed    Constitutional: Middle-aged -American female, appears uncomfortable but nontoxic  Head: Normocephalic, Atraumatic  Eyes: Pupils are equal, round, and reactive to light, EOMI  Neck: Supple, non-tender  Cardiovascular: Regular rate and rhythm, no murmurs, rubs, or gallops, + 2 radial pulses bilaterally  Chest: Normal work of breathing and chest excursion bilaterally  Lungs: Clear to ausculation bilaterally, no wheezes, no rhonchi  Abdomen: Soft, moderate right lower quadrant and right upper quadrant tenderness with no rebound or guarding, non distended, normoactive bowel sounds  Back: No evidence of trauma or deformity  Extremities: No evidence of trauma or deformity, no LE edema.  No streaking erythema, vesicular lesions, ulcerations or bulla  Skin: Warm and dry, normal cap refill  Neuro: Alert and appropriate, CN intact, normal speech, moving all 4 extremities freely and symmetrically  Psychiatric: Normal mood and affect       Diagnostic Study Results     Labs -     Recent Results (from the past 12 hour(s))   CBC WITH AUTOMATED DIFF    Collection Time: 02/16/21  4:15 AM   Result Value Ref Range    WBC 7.8 4.6 - 13.2 K/uL    RBC 3.90 (L) 4.20 - 5.30 M/uL    HGB 10.4 (L) 12.0 - 16.0 g/dL    HCT 31.8 (L) 35.0 - 45.0 %    MCV 81.5 74.0 - 97.0 FL    MCH 26.7 24.0 - 34.0 PG    MCHC 32.7 31.0 - 37.0 g/dL    RDW 15.6 (H) 11.6 - 14.5 %    PLATELET 355 570 - 586 K/uL    MPV 9.2 9.2 - 11.8 FL    NEUTROPHILS 59 40 - 73 %    LYMPHOCYTES 31 21 - 52 % MONOCYTES 8 3 - 10 %    EOSINOPHILS 2 0 - 5 %    BASOPHILS 0 0 - 2 %    ABS. NEUTROPHILS 4.6 1.8 - 8.0 K/UL    ABS. LYMPHOCYTES 2.4 0.9 - 3.6 K/UL    ABS. MONOCYTES 0.6 0.05 - 1.2 K/UL    ABS. EOSINOPHILS 0.2 0.0 - 0.4 K/UL    ABS. BASOPHILS 0.0 0.0 - 0.1 K/UL    DF AUTOMATED     METABOLIC PANEL, COMPREHENSIVE    Collection Time: 02/16/21  4:15 AM   Result Value Ref Range    Sodium 142 136 - 145 mmol/L    Potassium 3.4 (L) 3.5 - 5.5 mmol/L    Chloride 106 100 - 111 mmol/L    CO2 30 21 - 32 mmol/L    Anion gap 6 3.0 - 18 mmol/L    Glucose 107 (H) 74 - 99 mg/dL    BUN 8 7.0 - 18 MG/DL    Creatinine 0.78 0.6 - 1.3 MG/DL    BUN/Creatinine ratio 10 (L) 12 - 20      GFR est AA >60 >60 ml/min/1.73m2    GFR est non-AA >60 >60 ml/min/1.73m2    Calcium 8.5 8.5 - 10.1 MG/DL    Bilirubin, total 0.2 0.2 - 1.0 MG/DL    ALT (SGPT) 23 13 - 56 U/L    AST (SGOT) 16 10 - 38 U/L    Alk. phosphatase 99 45 - 117 U/L    Protein, total 7.7 6.4 - 8.2 g/dL    Albumin 3.4 3.4 - 5.0 g/dL    Globulin 4.3 (H) 2.0 - 4.0 g/dL    A-G Ratio 0.8 0.8 - 1.7     LIPASE    Collection Time: 02/16/21  4:15 AM   Result Value Ref Range    Lipase 36 (L) 73 - 393 U/L   HCG QL SERUM    Collection Time: 02/16/21  4:15 AM   Result Value Ref Range    HCG, Ql. Negative NEG     URINALYSIS W/ RFLX MICROSCOPIC    Collection Time: 02/16/21  6:15 AM   Result Value Ref Range    Color YELLOW      Appearance CLEAR      Specific gravity 1.027 1.005 - 1.030      pH (UA) 6.5 5.0 - 8.0      Protein Negative NEG mg/dL    Glucose Negative NEG mg/dL    Ketone Negative NEG mg/dL    Bilirubin Negative NEG      Blood Negative NEG      Urobilinogen 0.2 0.2 - 1.0 EU/dL    Nitrites Negative NEG      Leukocyte Esterase Negative NEG         Radiologic Studies -   CT ABD PELV W CONT   Final Result      Heterogeneous gallbladder contents suggests gallstones and/or sludge. Distended thickened gallbladder with surrounding fluid/infiltration.  Findings   most consistent with cholecystitis. CT Results  (Last 48 hours)               02/16/21 0513  CT ABD PELV W CONT Final result    Impression:      Heterogeneous gallbladder contents suggests gallstones and/or sludge. Distended thickened gallbladder with surrounding fluid/infiltration. Findings   most consistent with cholecystitis. Narrative:  EXAM: CT of the abdomen and pelvis       INDICATION: Pain. COMPARISON: None. TECHNIQUE: Axial CT imaging of the abdomen and pelvis was performed with   intravenous contrast. Multiplanar reformats were generated. Dose reduction   techniques used: automated exposure control, adjustment of the mAs and/or kVp   according to patient size, and iterative reconstruction techniques. Digital   imaging and communications in Medicine (DICOM) format image data are available   to nonaffiliated external healthcare facilities or entities on a secure, media   free, reciprocally searchable basis with patient authorization for at least 12   month after the study. _______________       FINDINGS:       LOWER CHEST: Unremarkable. LIVER, BILIARY: Liver is normal. No biliary dilation. The gallbladder is   distended and thickened with pericholecystic infiltration/fluid. The gallbladder   contents are heterogeneous. PANCREAS: Normal.       SPLEEN: Normal.       ADRENALS: Normal.       KIDNEYS: Normal.       LYMPH NODES: No enlarged lymph nodes. GASTROINTESTINAL TRACT: No bowel dilation or wall thickening. The appendix is   visualized and is within normal limits. PELVIC ORGANS: Unremarkable. VASCULATURE: Unremarkable. BONES: No acute or aggressive osseous abnormalities identified. OTHER: None.       _______________               CXR Results  (Last 48 hours)    None            Medical Decision Making   I am the first provider for this patient.     I reviewed the vital signs, available nursing notes, past medical history, past surgical history, family history and social history. Vital Signs-Reviewed the patient's vital signs. Pulse Oximetry Analysis -100% on room air    Cardiac Monitor:  Rate: 83 bpm  Rhythm: Regular    Records Reviewed: Nursing Notes and Old Medical Records    Provider Notes:   41 y.o. female presenting with right-sided abdominal pain. On exam patient is afebrile. She appears uncomfortable but nontoxic. Her abdomen soft and nondistended however with right lower and right upper quadrant tenderness. No rebound or guarding. Will obtain lab work and imaging studies. Will provide symptom control and reassess. Procedures:  Procedures    ED Course:   3:56 AM   Initial assessment performed. The patients presenting problems have been discussed, and they are in agreement with the care plan formulated and outlined with them. I have encouraged them to ask questions as they arise throughout their visit. SMOKING CESSATION:  The patient was counseled on the dangers of tobacco use, and was advised to quit. Reviewed strategies to maximize success, including removing cigarettes and smoking materials from environment. Discussion took 3-5 minutes, and pt expressed understanding. 6:24 AM CT imaging consistent with acute cholecystitis. LFTs within normal limits discussed patient's history, exam, and available diagnostics results with Arthur Palencia MD, general surgeon, who agree with admission to his service           Diagnosis and Disposition         Core Measures:  For Hospitalized Patients:    1. Hospitalization Decision Time:  The decision to hospitalize the patient was made by She Cross DO at 6:26 AM on 2/16/2021    2.  Aspirin: Aspirin was not given because the patient did not present with a stroke at the time of their Emergency Department evaluation    6:26 AM  Patient is being admitted to the hospital by Arthur Palencia MD. The results of their tests and reasons for their admission have been discussed with them and/or available family. They convey agreement and understanding for the need to be admitted and for their admission diagnosis. CONDITIONS ON ADMISSION:MRSA is not present at the time of admission. Wound infection is not present at the time of admission. Pressure Ulcer is not present at the time of admission. CLINICAL IMPRESSION:    1. Acute cholecystitis      ____________________________________     Please note that this dictation was completed with [employer], the computer voice recognition software. Quite often unanticipated grammatical, syntax, homophones, and other interpretive errors are inadvertently transcribed by the computer software. Please disregard these errors. Please excuse any errors that have escaped final proofreading.

## 2021-02-16 NOTE — ROUTINE PROCESS
TRANSFER - OUT REPORT: 
 
Verbal report given to Jose Ramon(name) on Merissa Teresa  being transferred to Medical(unit) for routine progression of care Report consisted of patients Situation, Background, Assessment and  
Recommendations(SBAR). Information from the following report(s) SBAR was reviewed with the receiving nurse. Lines:  
Peripheral IV 02/16/21 Right Antecubital (Active) Site Assessment Clean, dry, & intact 02/16/21 0417 Phlebitis Assessment 0 02/16/21 0417 Infiltration Assessment 0 02/16/21 0417 Dressing Status Clean, dry, & intact 02/16/21 0417 Dressing Type Transparent 02/16/21 0417 Opportunity for questions and clarification was provided. Patient transported with: 
 babbel

## 2021-02-16 NOTE — PROGRESS NOTES
Reason for Admission:  Abdominal pain                    RUR Score: low; 6%                    Plan for utilizing home health:    Unlikely o      PCP: First and Last name:     Name of Practice:    Are you a current patient: Yes/No:    Approximate date of last visit:    Can you participate in a virtual visit with your PCP:                     Current Advanced Directive/Advance Care Plan:  Not on file    Healthcare Decision Maker:   Click here to complete [employer] including selection of the Healthcare Decision Maker Relationship (ie \"Primary\")                         Transition of Care Plan:                      Chart reviewed. Per physician documentation Hannah Daugherty is a 41 y.o. female with PMHX tobacco use presents to the emergency department via private vehicle C/O right-sided abdominal pain. Patient denies any radiation of her pain. Denies any nausea, vomiting or diarrhea. Denies any vaginal discharge or vaginal bleeding. States that she just finished her period. Reports a history of ectopic pregnancy. Denies fever, dysuria, and any other sxs or complaints. No relieving or exacerbating factors identified. States that she did not take anything for her pain. \"    Please encourage ambulation to assist with determining a transition of care. Anticipate pt will transition home with physician follow up when medically stable. CM to continue to follow and assist.    1200:  CM met with pt at bedside to discuss transition of care. Pt is independent and her adult children live with pt and will assist as needed. Pt does not have a PCP and would like assistance with a PCP. Pt with planned surgical intervention tomorrow. Please encourage ambulation as appropriate. Anticipate pt will transition home with physician follow up when medically stable. Care Management Interventions  Mode of Transport at Discharge:  Other (see comment)(Family)  Transition of Care Consult (CM Consult): Discharge Planning  Health Maintenance Reviewed: Yes  Current Support Network: Family Lives Nearby  The Plan for Transition of Care is Related to the Following Treatment Goals : Home with physician follow up   Discharge Location  Discharge Placement: Home with family assistance

## 2021-02-16 NOTE — PROGRESS NOTES
0758-Report recived care assumed when pt arrives,    0882-Paged MD dumont pt has 10/10 abdominal pain, said Lorenzo in the ED helped also will ask if pt  Can have clears. 1554-Spoke to MD Stephenson he said he would place order told him she was in pain said that bentyl worked earlier.

## 2021-02-17 ENCOUNTER — ANESTHESIA (OUTPATIENT)
Dept: SURGERY | Age: 46
DRG: 263 | End: 2021-02-17
Payer: MEDICAID

## 2021-02-17 ENCOUNTER — ANESTHESIA EVENT (OUTPATIENT)
Dept: SURGERY | Age: 46
DRG: 263 | End: 2021-02-17
Payer: MEDICAID

## 2021-02-17 LAB
ALBUMIN SERPL-MCNC: 3.2 G/DL (ref 3.4–5)
ALBUMIN/GLOB SERPL: 0.8 {RATIO} (ref 0.8–1.7)
ALP SERPL-CCNC: 98 U/L (ref 45–117)
ALT SERPL-CCNC: 23 U/L (ref 13–56)
ANION GAP SERPL CALC-SCNC: 7 MMOL/L (ref 3–18)
AST SERPL-CCNC: 14 U/L (ref 10–38)
BILIRUB SERPL-MCNC: 0.7 MG/DL (ref 0.2–1)
BUN SERPL-MCNC: 6 MG/DL (ref 7–18)
BUN/CREAT SERPL: 6 (ref 12–20)
CALCIUM SERPL-MCNC: 8.9 MG/DL (ref 8.5–10.1)
CHLORIDE SERPL-SCNC: 102 MMOL/L (ref 100–111)
CO2 SERPL-SCNC: 27 MMOL/L (ref 21–32)
CREAT SERPL-MCNC: 0.93 MG/DL (ref 0.6–1.3)
GLOBULIN SER CALC-MCNC: 4.2 G/DL (ref 2–4)
GLUCOSE SERPL-MCNC: 129 MG/DL (ref 74–99)
POTASSIUM SERPL-SCNC: 4.4 MMOL/L (ref 3.5–5.5)
PROT SERPL-MCNC: 7.4 G/DL (ref 6.4–8.2)
SODIUM SERPL-SCNC: 136 MMOL/L (ref 136–145)

## 2021-02-17 PROCEDURE — 77030006643: Performed by: ANESTHESIOLOGY

## 2021-02-17 PROCEDURE — 74011250636 HC RX REV CODE- 250/636: Performed by: SURGERY

## 2021-02-17 PROCEDURE — 76060000033 HC ANESTHESIA 1 TO 1.5 HR: Performed by: SURGERY

## 2021-02-17 PROCEDURE — 77030010031 HC SCIS ENDOSC MPLR J&J -C: Performed by: SURGERY

## 2021-02-17 PROCEDURE — 77030008608 HC TRCR ENDOSC SMTH AMR -B: Performed by: SURGERY

## 2021-02-17 PROCEDURE — 0FT44ZZ RESECTION OF GALLBLADDER, PERCUTANEOUS ENDOSCOPIC APPROACH: ICD-10-PCS | Performed by: SURGERY

## 2021-02-17 PROCEDURE — 74011000250 HC RX REV CODE- 250: Performed by: NURSE ANESTHETIST, CERTIFIED REGISTERED

## 2021-02-17 PROCEDURE — 77030040361 HC SLV COMPR DVT MDII -B: Performed by: SURGERY

## 2021-02-17 PROCEDURE — 88307 TISSUE EXAM BY PATHOLOGIST: CPT

## 2021-02-17 PROCEDURE — 74011250636 HC RX REV CODE- 250/636: Performed by: NURSE ANESTHETIST, CERTIFIED REGISTERED

## 2021-02-17 PROCEDURE — 76010000149 HC OR TIME 1 TO 1.5 HR: Performed by: SURGERY

## 2021-02-17 PROCEDURE — 77030008683 HC TU ET CUF COVD -A: Performed by: ANESTHESIOLOGY

## 2021-02-17 PROCEDURE — 88304 TISSUE EXAM BY PATHOLOGIST: CPT

## 2021-02-17 PROCEDURE — 74011250636 HC RX REV CODE- 250/636: Performed by: ANESTHESIOLOGY

## 2021-02-17 PROCEDURE — 77030020829: Performed by: SURGERY

## 2021-02-17 PROCEDURE — 77030002966 HC SUT PDS J&J -A: Performed by: SURGERY

## 2021-02-17 PROCEDURE — 77030009403 HC ELECTRD ENDO MEGA -B: Performed by: SURGERY

## 2021-02-17 PROCEDURE — 77030027743 HC APPL F/HEMSTAT BARD -B: Performed by: SURGERY

## 2021-02-17 PROCEDURE — 65270000029 HC RM PRIVATE

## 2021-02-17 PROCEDURE — 77030032060 HC PWDR HEMSTAT ARISTA ASRB 3GM BARD -C: Performed by: SURGERY

## 2021-02-17 PROCEDURE — 77030010515 HC APPL ENDOCLP LIG J&J -B: Performed by: SURGERY

## 2021-02-17 PROCEDURE — 77030009851 HC PCH RTVR ENDOSC AMR -B: Performed by: SURGERY

## 2021-02-17 PROCEDURE — 74011000258 HC RX REV CODE- 258: Performed by: NURSE ANESTHETIST, CERTIFIED REGISTERED

## 2021-02-17 PROCEDURE — 77030008477 HC STYL SATN SLP COVD -A: Performed by: ANESTHESIOLOGY

## 2021-02-17 PROCEDURE — 77030016151 HC PROTCTR LNS DFOG COVD -B: Performed by: SURGERY

## 2021-02-17 PROCEDURE — 77030002933 HC SUT MCRYL J&J -A: Performed by: SURGERY

## 2021-02-17 PROCEDURE — 74011250637 HC RX REV CODE- 250/637: Performed by: SURGERY

## 2021-02-17 PROCEDURE — 77030031139 HC SUT VCRL2 J&J -A: Performed by: SURGERY

## 2021-02-17 PROCEDURE — 77030008518 HC TBNG INSUF ENDO STRY -B: Performed by: SURGERY

## 2021-02-17 PROCEDURE — 76210000016 HC OR PH I REC 1 TO 1.5 HR: Performed by: SURGERY

## 2021-02-17 PROCEDURE — 88313 SPECIAL STAINS GROUP 2: CPT

## 2021-02-17 PROCEDURE — 77030012770 HC TRCR OPT FX AMR -B: Performed by: SURGERY

## 2021-02-17 PROCEDURE — 77030010507 HC ADH SKN DERMBND J&J -B: Performed by: SURGERY

## 2021-02-17 PROCEDURE — 77030003578 HC NDL INSUF VERES AMR -B: Performed by: SURGERY

## 2021-02-17 PROCEDURE — 80053 COMPREHEN METABOLIC PANEL: CPT

## 2021-02-17 PROCEDURE — 77030008574 HC TBNG SUC IRR STRY -B: Performed by: SURGERY

## 2021-02-17 PROCEDURE — 77030018875 HC APPL CLP LIG4 J&J -B: Performed by: SURGERY

## 2021-02-17 PROCEDURE — 2709999900 HC NON-CHARGEABLE SUPPLY: Performed by: SURGERY

## 2021-02-17 PROCEDURE — 0FB04ZX EXCISION OF LIVER, PERCUTANEOUS ENDOSCOPIC APPROACH, DIAGNOSTIC: ICD-10-PCS | Performed by: SURGERY

## 2021-02-17 PROCEDURE — 36415 COLL VENOUS BLD VENIPUNCTURE: CPT

## 2021-02-17 PROCEDURE — 74011000250 HC RX REV CODE- 250: Performed by: SURGERY

## 2021-02-17 RX ORDER — ROCURONIUM BROMIDE 10 MG/ML
INJECTION, SOLUTION INTRAVENOUS AS NEEDED
Status: DISCONTINUED | OUTPATIENT
Start: 2021-02-17 | End: 2021-02-17 | Stop reason: HOSPADM

## 2021-02-17 RX ORDER — HYDROMORPHONE HYDROCHLORIDE 2 MG/ML
0.5 INJECTION, SOLUTION INTRAMUSCULAR; INTRAVENOUS; SUBCUTANEOUS
Status: DISCONTINUED | OUTPATIENT
Start: 2021-02-17 | End: 2021-02-17 | Stop reason: HOSPADM

## 2021-02-17 RX ORDER — NALOXONE HYDROCHLORIDE 0.4 MG/ML
0.4 INJECTION, SOLUTION INTRAMUSCULAR; INTRAVENOUS; SUBCUTANEOUS AS NEEDED
Status: DISCONTINUED | OUTPATIENT
Start: 2021-02-17 | End: 2021-02-17 | Stop reason: HOSPADM

## 2021-02-17 RX ORDER — SODIUM CHLORIDE 0.9 % (FLUSH) 0.9 %
5-40 SYRINGE (ML) INJECTION EVERY 8 HOURS
Status: DISCONTINUED | OUTPATIENT
Start: 2021-02-17 | End: 2021-02-17 | Stop reason: HOSPADM

## 2021-02-17 RX ORDER — HYDROMORPHONE HYDROCHLORIDE 2 MG/ML
INJECTION, SOLUTION INTRAMUSCULAR; INTRAVENOUS; SUBCUTANEOUS AS NEEDED
Status: DISCONTINUED | OUTPATIENT
Start: 2021-02-17 | End: 2021-02-17 | Stop reason: HOSPADM

## 2021-02-17 RX ORDER — SODIUM CHLORIDE 0.9 % (FLUSH) 0.9 %
5-40 SYRINGE (ML) INJECTION AS NEEDED
Status: DISCONTINUED | OUTPATIENT
Start: 2021-02-17 | End: 2021-02-17 | Stop reason: HOSPADM

## 2021-02-17 RX ORDER — FENTANYL CITRATE 50 UG/ML
50 INJECTION, SOLUTION INTRAMUSCULAR; INTRAVENOUS AS NEEDED
Status: DISCONTINUED | OUTPATIENT
Start: 2021-02-17 | End: 2021-02-17 | Stop reason: HOSPADM

## 2021-02-17 RX ORDER — HYDROMORPHONE HYDROCHLORIDE 1 MG/ML
1 INJECTION, SOLUTION INTRAMUSCULAR; INTRAVENOUS; SUBCUTANEOUS ONCE
Status: COMPLETED | OUTPATIENT
Start: 2021-02-17 | End: 2021-02-17

## 2021-02-17 RX ORDER — LIDOCAINE HYDROCHLORIDE 20 MG/ML
INJECTION, SOLUTION EPIDURAL; INFILTRATION; INTRACAUDAL; PERINEURAL AS NEEDED
Status: DISCONTINUED | OUTPATIENT
Start: 2021-02-17 | End: 2021-02-17 | Stop reason: HOSPADM

## 2021-02-17 RX ORDER — FLUMAZENIL 0.1 MG/ML
0.2 INJECTION INTRAVENOUS
Status: DISCONTINUED | OUTPATIENT
Start: 2021-02-17 | End: 2021-02-17 | Stop reason: HOSPADM

## 2021-02-17 RX ORDER — PROPOFOL 10 MG/ML
INJECTION, EMULSION INTRAVENOUS AS NEEDED
Status: DISCONTINUED | OUTPATIENT
Start: 2021-02-17 | End: 2021-02-17 | Stop reason: HOSPADM

## 2021-02-17 RX ORDER — KETAMINE HYDROCHLORIDE 10 MG/ML
INJECTION, SOLUTION INTRAMUSCULAR; INTRAVENOUS AS NEEDED
Status: DISCONTINUED | OUTPATIENT
Start: 2021-02-17 | End: 2021-02-17 | Stop reason: HOSPADM

## 2021-02-17 RX ORDER — SODIUM CHLORIDE, SODIUM LACTATE, POTASSIUM CHLORIDE, CALCIUM CHLORIDE 600; 310; 30; 20 MG/100ML; MG/100ML; MG/100ML; MG/100ML
125 INJECTION, SOLUTION INTRAVENOUS CONTINUOUS
Status: DISCONTINUED | OUTPATIENT
Start: 2021-02-17 | End: 2021-02-17 | Stop reason: HOSPADM

## 2021-02-17 RX ORDER — ONDANSETRON 2 MG/ML
INJECTION INTRAMUSCULAR; INTRAVENOUS AS NEEDED
Status: DISCONTINUED | OUTPATIENT
Start: 2021-02-17 | End: 2021-02-17 | Stop reason: HOSPADM

## 2021-02-17 RX ORDER — ESMOLOL HYDROCHLORIDE 10 MG/ML
INJECTION INTRAVENOUS AS NEEDED
Status: DISCONTINUED | OUTPATIENT
Start: 2021-02-17 | End: 2021-02-17 | Stop reason: HOSPADM

## 2021-02-17 RX ORDER — BUPIVACAINE HYDROCHLORIDE 2.5 MG/ML
INJECTION, SOLUTION EPIDURAL; INFILTRATION; INTRACAUDAL AS NEEDED
Status: DISCONTINUED | OUTPATIENT
Start: 2021-02-17 | End: 2021-02-17 | Stop reason: HOSPADM

## 2021-02-17 RX ORDER — CEFAZOLIN SODIUM/WATER 2 G/20 ML
2 SYRINGE (ML) INTRAVENOUS ONCE
Status: DISCONTINUED | OUTPATIENT
Start: 2021-02-17 | End: 2021-02-17 | Stop reason: HOSPADM

## 2021-02-17 RX ORDER — GLYCOPYRROLATE 0.2 MG/ML
INJECTION INTRAMUSCULAR; INTRAVENOUS AS NEEDED
Status: DISCONTINUED | OUTPATIENT
Start: 2021-02-17 | End: 2021-02-17 | Stop reason: HOSPADM

## 2021-02-17 RX ORDER — FENTANYL CITRATE 50 UG/ML
INJECTION, SOLUTION INTRAMUSCULAR; INTRAVENOUS AS NEEDED
Status: DISCONTINUED | OUTPATIENT
Start: 2021-02-17 | End: 2021-02-17 | Stop reason: HOSPADM

## 2021-02-17 RX ORDER — MIDAZOLAM HYDROCHLORIDE 1 MG/ML
INJECTION, SOLUTION INTRAMUSCULAR; INTRAVENOUS AS NEEDED
Status: DISCONTINUED | OUTPATIENT
Start: 2021-02-17 | End: 2021-02-17 | Stop reason: HOSPADM

## 2021-02-17 RX ORDER — NEOSTIGMINE METHYLSULFATE 1 MG/ML
INJECTION, SOLUTION INTRAVENOUS AS NEEDED
Status: DISCONTINUED | OUTPATIENT
Start: 2021-02-17 | End: 2021-02-17 | Stop reason: HOSPADM

## 2021-02-17 RX ORDER — SODIUM CHLORIDE, SODIUM LACTATE, POTASSIUM CHLORIDE, CALCIUM CHLORIDE 600; 310; 30; 20 MG/100ML; MG/100ML; MG/100ML; MG/100ML
125 INJECTION, SOLUTION INTRAVENOUS CONTINUOUS
Status: DISCONTINUED | OUTPATIENT
Start: 2021-02-17 | End: 2021-02-17

## 2021-02-17 RX ORDER — METOCLOPRAMIDE HYDROCHLORIDE 5 MG/ML
INJECTION INTRAMUSCULAR; INTRAVENOUS AS NEEDED
Status: DISCONTINUED | OUTPATIENT
Start: 2021-02-17 | End: 2021-02-17 | Stop reason: HOSPADM

## 2021-02-17 RX ADMIN — PHENYLEPHRINE HYDROCHLORIDE 100 MCG: 10 INJECTION INTRAVENOUS at 16:13

## 2021-02-17 RX ADMIN — SODIUM CHLORIDE, SODIUM LACTATE, POTASSIUM CHLORIDE, AND CALCIUM CHLORIDE: 600; 310; 30; 20 INJECTION, SOLUTION INTRAVENOUS at 16:20

## 2021-02-17 RX ADMIN — POTASSIUM CHLORIDE 10 MEQ: 10 INJECTION, SOLUTION INTRAVENOUS at 00:29

## 2021-02-17 RX ADMIN — SODIUM CHLORIDE, SODIUM LACTATE, POTASSIUM CHLORIDE, AND CALCIUM CHLORIDE 125 ML: 600; 310; 30; 20 INJECTION, SOLUTION INTRAVENOUS at 17:45

## 2021-02-17 RX ADMIN — LIDOCAINE HYDROCHLORIDE 60 MG: 20 INJECTION, SOLUTION EPIDURAL; INFILTRATION; INTRACAUDAL; PERINEURAL at 16:07

## 2021-02-17 RX ADMIN — Medication 10 ML: at 19:18

## 2021-02-17 RX ADMIN — Medication 10 ML: at 05:33

## 2021-02-17 RX ADMIN — KETAMINE HYDROCHLORIDE 10 MG: 10 INJECTION, SOLUTION INTRAMUSCULAR; INTRAVENOUS at 16:44

## 2021-02-17 RX ADMIN — PHENYLEPHRINE HYDROCHLORIDE 100 MCG: 10 INJECTION INTRAVENOUS at 16:28

## 2021-02-17 RX ADMIN — METRONIDAZOLE 500 MG: 500 INJECTION, SOLUTION INTRAVENOUS at 21:32

## 2021-02-17 RX ADMIN — GLYCOPYRROLATE 0.6 MG: 0.2 INJECTION INTRAMUSCULAR; INTRAVENOUS at 17:07

## 2021-02-17 RX ADMIN — Medication 3 MG: at 17:07

## 2021-02-17 RX ADMIN — HYDROMORPHONE HYDROCHLORIDE 0.5 MG: 2 INJECTION, SOLUTION INTRAMUSCULAR; INTRAVENOUS; SUBCUTANEOUS at 17:50

## 2021-02-17 RX ADMIN — KETAMINE HYDROCHLORIDE 20 MG: 10 INJECTION, SOLUTION INTRAMUSCULAR; INTRAVENOUS at 16:07

## 2021-02-17 RX ADMIN — MIDAZOLAM 2 MG: 1 INJECTION INTRAMUSCULAR; INTRAVENOUS at 15:55

## 2021-02-17 RX ADMIN — ESMOLOL HYDROCHLORIDE 30 MG: 10 INJECTION, SOLUTION INTRAVENOUS at 17:18

## 2021-02-17 RX ADMIN — PHENYLEPHRINE HYDROCHLORIDE 100 MCG: 10 INJECTION INTRAVENOUS at 16:17

## 2021-02-17 RX ADMIN — DEXTROSE MONOHYDRATE, SODIUM CHLORIDE, AND POTASSIUM CHLORIDE 125 ML/HR: 50; 4.5; 1.49 INJECTION, SOLUTION INTRAVENOUS at 19:17

## 2021-02-17 RX ADMIN — CIPROFLOXACIN 400 MG: 2 INJECTION, SOLUTION INTRAVENOUS at 21:34

## 2021-02-17 RX ADMIN — FENTANYL CITRATE 50 MCG: 50 INJECTION, SOLUTION INTRAMUSCULAR; INTRAVENOUS at 16:00

## 2021-02-17 RX ADMIN — ROCURONIUM BROMIDE 35 MG: 10 INJECTION, SOLUTION INTRAVENOUS at 16:11

## 2021-02-17 RX ADMIN — HYDROMORPHONE HYDROCHLORIDE 1 MG: 1 INJECTION, SOLUTION INTRAMUSCULAR; INTRAVENOUS; SUBCUTANEOUS at 03:14

## 2021-02-17 RX ADMIN — CIPROFLOXACIN 400 MG: 2 INJECTION, SOLUTION INTRAVENOUS at 10:37

## 2021-02-17 RX ADMIN — ROCURONIUM BROMIDE 10 MG: 10 INJECTION, SOLUTION INTRAVENOUS at 16:14

## 2021-02-17 RX ADMIN — FENTANYL CITRATE 50 MCG: 50 INJECTION, SOLUTION INTRAMUSCULAR; INTRAVENOUS at 15:55

## 2021-02-17 RX ADMIN — PROPOFOL 200 MG: 10 INJECTION, EMULSION INTRAVENOUS at 16:07

## 2021-02-17 RX ADMIN — HYDROMORPHONE HYDROCHLORIDE 1 MG: 1 INJECTION, SOLUTION INTRAMUSCULAR; INTRAVENOUS; SUBCUTANEOUS at 08:50

## 2021-02-17 RX ADMIN — POTASSIUM CHLORIDE 10 MEQ: 10 INJECTION, SOLUTION INTRAVENOUS at 03:13

## 2021-02-17 RX ADMIN — HYDROMORPHONE HYDROCHLORIDE 0.5 MG: 2 INJECTION, SOLUTION INTRAMUSCULAR; INTRAVENOUS; SUBCUTANEOUS at 18:45

## 2021-02-17 RX ADMIN — GLYCOPYRROLATE 0.2 MG: 0.2 INJECTION INTRAMUSCULAR; INTRAVENOUS at 15:55

## 2021-02-17 RX ADMIN — METOCLOPRAMIDE 5 MG: 5 INJECTION, SOLUTION INTRAMUSCULAR; INTRAVENOUS at 15:55

## 2021-02-17 RX ADMIN — HYDROMORPHONE HYDROCHLORIDE 0.5 MG: 2 INJECTION, SOLUTION INTRAMUSCULAR; INTRAVENOUS; SUBCUTANEOUS at 17:08

## 2021-02-17 RX ADMIN — ROCURONIUM BROMIDE 5 MG: 10 INJECTION, SOLUTION INTRAVENOUS at 16:07

## 2021-02-17 RX ADMIN — SODIUM CHLORIDE, SODIUM LACTATE, POTASSIUM CHLORIDE, AND CALCIUM CHLORIDE 125 ML/HR: 600; 310; 30; 20 INJECTION, SOLUTION INTRAVENOUS at 15:33

## 2021-02-17 RX ADMIN — ONDANSETRON HYDROCHLORIDE 4 MG: 2 INJECTION INTRAMUSCULAR; INTRAVENOUS at 16:50

## 2021-02-17 RX ADMIN — HYDROMORPHONE HYDROCHLORIDE 0.5 MG: 2 INJECTION, SOLUTION INTRAMUSCULAR; INTRAVENOUS; SUBCUTANEOUS at 17:45

## 2021-02-17 RX ADMIN — KETAMINE HYDROCHLORIDE 10 MG: 10 INJECTION, SOLUTION INTRAMUSCULAR; INTRAVENOUS at 16:02

## 2021-02-17 RX ADMIN — METRONIDAZOLE 500 MG: 500 INJECTION, SOLUTION INTRAVENOUS at 09:02

## 2021-02-17 RX ADMIN — OXYCODONE AND ACETAMINOPHEN 1 TABLET: 5; 325 TABLET ORAL at 20:46

## 2021-02-17 RX ADMIN — HYDROMORPHONE HYDROCHLORIDE 0.5 MG: 2 INJECTION, SOLUTION INTRAMUSCULAR; INTRAVENOUS; SUBCUTANEOUS at 18:17

## 2021-02-17 RX ADMIN — DEXTROSE MONOHYDRATE, SODIUM CHLORIDE, AND POTASSIUM CHLORIDE 125 ML/HR: 50; 4.5; 1.49 INJECTION, SOLUTION INTRAVENOUS at 08:50

## 2021-02-17 RX ADMIN — POTASSIUM CHLORIDE 10 MEQ: 10 INJECTION, SOLUTION INTRAVENOUS at 02:08

## 2021-02-17 RX ADMIN — ONDANSETRON 4 MG: 2 INJECTION INTRAMUSCULAR; INTRAVENOUS at 03:14

## 2021-02-17 RX ADMIN — HYDROMORPHONE HYDROCHLORIDE 1 MG: 1 INJECTION, SOLUTION INTRAMUSCULAR; INTRAVENOUS; SUBCUTANEOUS at 19:17

## 2021-02-17 RX ADMIN — HYDROMORPHONE HYDROCHLORIDE 0.5 MG: 2 INJECTION, SOLUTION INTRAMUSCULAR; INTRAVENOUS; SUBCUTANEOUS at 16:48

## 2021-02-17 NOTE — H&P
[facility] [place]  HISTORY AND PHYSICAL    Name:  Cathy Lang  MR#:   [id number]  :  1975  ACCOUNT #:  [de-identified]  ADMIT DATE:  2021      GENERAL SURGERY ADMISSION HISTORY AND PHYSICAL AND PREOPERATIVE NOTE    PRIMARY CARE PHYSICIAN:  The patient has no primary care physician. REASON FOR ADMISSION:  Cholecystitis. HISTORY OF PRESENT ILLNESS:  The patient is a 50-year-old -American female, admitted through the [facility] Emergency Room with acute onset right upper quadrant/flank pain, which began shortly after eating her evening meal on the night prior to admission. She denies any previous similar episodes. She says that she has had some nausea, but no vomiting. Denies any recent changes in bowel movements or urination. No fever or chills. PAST MEDICAL HISTORY:  History of ectopic pregnancy. PAST SURGICAL HISTORY:  1. Laparoscopy for ectopic pregnancy. 2.  Foot surgery. ALLERGIES:  TO NAPROXEN AND TRAMADOL GIVES HIVES, PENICILLIN SHE FELT WAS CAUSING ITCHING WHEN SHE WAS A CHILD. MEDICATIONS:  Denies. SOCIAL HISTORY:  She smokes between the quarter pack and half a pack of cigarettes every day, has done so for the past 20 plus years. Denies any alcohol use. Denies any drug use. REVIEW OF SYSTEMS:  A 12-point review of systems was reviewed with the patient and was negative apart from that listed in the HPI. PHYSICAL EXAMINATION:  GENERAL:  She is a well-developed and well-nourished, in no acute distress. VITAL SIGNS:  She has been afebrile since arrival, blood pressure, she has been persistently hypertensive, last blood pressure 147/92, pulse 72, respirations 16, satting 100% on room air, temperature last reported 98. 1. HEENT:  Normocephalic and atraumatic. Pupils are equal, round, and reactive to light and accommodation. Extraocular movements intact. Sclerae anicteric bilaterally. NECK:  Supple. No JVD.   CARDIOVASCULAR:  Regular rate and rhythm. LUNGS:  Clear to auscultation. ABDOMEN:  Soft and nondistended. Good bowel sounds. Tender with guarding. Positive Kuo sign in the right upper quadrant. No other peritoneal signs. No visible hernias. RECTAL:  Deferred. EXTREMITIES:  No clubbing or cyanosis. Good capillary refill. No calf tenderness. ANCILLARY STUDIES:  Labs, white count on admission was normal at 7.8, neutrophils normal at 59, H and H 10.4 and 31.8, platelets 209. Urinalysis was negative. BMP showed a low potassium at 3.4 and that is being replaced. Albumin borderline low at 3.4. Liver function tests; total bilirubin normal at 0.2, ALT and AST normal at 23 and 16. Alkaline phosphatase normal at 99. Lipase 36. Urine pregnancy test negative. CT scan of the abdomen and pelvis with contrast showed normal-appearing liver, no biliary dilatation. Gallbladder distended and thickened with pericholecystic fluids/infiltrations with heterogenous gallbladder contents consistent with sludge/stones. ASSESSMENT AND PLAN:  A 81-year-old -American female with acute cholecystitis, possibly secondary to cholelithiasis. The patient was started on Cipro and Flagyl by emergency room and these have been continued. Recommended proceeding to the operating room for laparoscopic cholecystectomy. I did discuss with the patient the nature of the surgery, alternatives, benefits, and risks.   The risks include, but are not limited to, medication reaction, anesthesia complications, bleeding, infection, possible need to convert to an open procedure, inadvertent injury to intra-abdominal structures requiring additional surgery, retained or de rudolph common duct stones requiring an endoscopic or other intervention, postoperative bile leak or fluid collection requiring drainage or other intervention, postoperative pancreatitis, persistence of symptoms despite surgery, incisional hernia, poor wound healing postoperatively, etc.  The patient understands these risks and is willing to give informed consent to proceed with surgery.       Justine Boucher MD      [id number]  D:  02/16/2021 20:37  T:  02/17/2021 1:16  JOB #:  [id number]

## 2021-02-17 NOTE — PROGRESS NOTES
Bedside and Verbal shift change report given to Andrew (oncoming nurse) by WADE Goins (offgoing nurse). Report included the following information SBAR, Kardex, Intake/Output, MAR and Recent Results. Shift Summary-   Patient Vitals for the past 12 hrs:   Temp Pulse Resp BP SpO2   02/17/21 0208 98 °F (36.7 °C) 75 16 129/81 100 %   02/16/21 2058 98.3 °F (36.8 °C) 80 16 (!) 150/90 100 %   Pt given PRN bentyl x1 overnight for pain rated 10/10 to abdomen. Pt given PRN dilaudid x2 overnight for pain rated 10/10 to abdomen. Pt given PRN percocet x1 overnight for pain rated 10/10 to abdomen. Bedside and Verbal shift change report given to Khanh Cuellar (oncoming nurse) by Andrew (offgoing nurse). Report included the following information SBAR, Kardex, Intake/Output, MAR and Recent Results.

## 2021-02-17 NOTE — INTERVAL H&P NOTE
Update History & Physical 
 
The Patient's History and Physical of February 17,  
 was reviewed with the patient and I examined the patient. There was no change. The surgical site was confirmed by the patient and me. Plan:  The risk, benefits, expected outcome, and alternative to the recommended procedure have been discussed with the patient. Patient understands and wants to proceed with the procedure.  
 
Electronically signed by Savanna Guallpa MD on 2/17/2021 at 3:35 PM

## 2021-02-17 NOTE — BRIEF OP NOTE
Brief Postoperative Note    Patient: Jeyson Thomas  YOB: 1975  MRN: [id number]    Date of Procedure: 2/17/2021     Pre-Op Diagnosis: CHOLECYSTITIS    Post-Op Diagnosis: Same as preoperative diagnosis. Procedure(s):  CHOLECYSTECTOMY LAPAROSCOPIC AND LIVER BIOPSY (PT IN ROOM #309)    Surgeon(s):   Martha Blum MD    Surgical Assistant: Surg Asst-1: Donna Pancoast    Anesthesia: General     Estimated Blood Loss (mL): Minimal    Complications: None    Specimens:   ID Type Source Tests Collected by Time Destination   1 : GALLBLADDER AND CONTENTS Preservative Gallbladder  Martha Blum MD 2/17/2021 1622 Pathology   2 : LIVER BIOPSY Preservative Liver specimen  Martha Blum MD 2/17/2021 1652 Pathology        Implants: * No implants in log *    Drains: * No LDAs found *    Findings: anatoliy    Electronically Signed by Farhan Noel MD on 2/17/2021 at 5:15 PM

## 2021-02-17 NOTE — ANESTHESIA POSTPROCEDURE EVALUATION
Post-Anesthesia Evaluation and Assessment    Cardiovascular Function/Vital Signs  Visit Vitals  /87   Pulse 80   Temp 37.2 °C (99 °F)   Resp 20   Ht 6' 0.01\" (1.829 m)   Wt 74.1 kg (163 lb 6.4 oz)   SpO2 100%   BMI 22.16 kg/m²       Patient is status post Procedure(s):  CHOLECYSTECTOMY LAPAROSCOPIC AND LIVER BIOPSY (PT IN ROOM #309). Nausea/Vomiting: Controlled. Postoperative hydration reviewed and adequate. Pain:  Pain Scale 1: Numeric (0 - 10) (02/17/21 1750)  Pain Intensity 1: 9 (02/17/21 1750)   Managed. Neurological Status:   Neuro (WDL): Exceptions to WDL (02/17/21 1728)   At baseline. Mental Status and Level of Consciousness: Arousable. Pulmonary Status:   O2 Device: Nasal cannula (02/17/21 1740)   Adequate oxygenation and airway patent. Complications related to anesthesia: None    Post-anesthesia assessment completed. No concerns. Patient has met all discharge requirements.     Signed By: Reinaldo Bucio MD    February 17, 2021

## 2021-02-17 NOTE — PROGRESS NOTES
02/17/21 1145   Vital Signs   Temp 98.7 °F (37.1 °C)   Temp Source Oral   Pulse (Heart Rate) 88   Heart Rate Source Monitor   Resp Rate 16   O2 Sat (%) 100 %   Level of Consciousness Alert   /78   MAP (Calculated) 89   BP 1 Method Automatic   BP 1 Location Left upper arm   BP Patient Position At rest   MEWS Score 1   Pain 1   Pain Scale 1 Numeric (0 - 10)   Pain Intensity 1 0   Patient Stated Pain Goal 0          02/17/21 1400   NPO Status   NPO greater than 8 hours? Yes   Last Bowel Movement   Last Bowel Movement Date 02/16/21   bowel/colon prep ordered? No   Mechanical Prep Complete? Not Ordered   Patient took antibiotic as ordered? Yes  (IV Ciprofloxacin & Metronidazole given prior to procedure. )   Pre-Procedure Checklist   Patient ID Verified Yes   ID Band Applied Yes   Allergy Band Applied Yes   Blood Band Applied No   Blood Bank [place] # n/a   Blood Band ID # Verified No (comment)  (Not present. )   Procedure Other (comment)  (Cholecystectomy Procedure. )   Dental Appliances None   Hair Pins, Hair Piece, Jewelry/piercings removed Yes (comment)  (Jewelry Removed. )   Makeup & nail polish removed Yes   Extra Labels Available Yes   Nasal Antisepsis Yes   Type of Bath Chlorhexidine (CHG)         S: Handoff report given for surgery/ Patient taken off unit to surgery. B: At 1450 pm, medical transport presented on unit to transport patient to surgery for Cholecystectomy procedure. Patient had Chlorhexidine bath, nasal cleanser & oral Cleansing performed for preparation for surgery. Additionally, patient's pre op checklist (as listed above) and bed linen/gown were changed completely prior to going down for surgery. Additionally, Security was contacted and were informed about patient's belongings that needed to be locked up in security. At 1500 pm, Security presented to bedside and took patient's cell phone and watch to security.    A: At 1503 pm, OR Nurse Kassidy Arrington was provided telephone handoff report and was informed about patient's most recent set of vital signs listed above. Nurse Yari Turner was informed that patient had received IV Ciprofloxacin 400 mg at 1037 am  and IV Metronidazole 500 mg at 902 am. Patient tolerated IV antibiotics without difficulty. Nurse Yari Turner was informed was informed that patient had his last bowel movement on 2/16/2021 and that patient's height was 182.9 cm and that the patient's weight was 74.1 kg as of this Am. Understanding of handoff report was verbalized and patient was transported via bed to surgery with OR Staff. R:Will continue with prescribed plan of care as directed.    Homer Herron  2/17/2021  3:07 PM

## 2021-02-17 NOTE — H&P
History & Physical    Patient: Jeyson Thomas MRN: [id number]  CSN: [id number]    YOB: 1975  Age: 41 y.o. Sex: female      DOA: 2/16/2021       HPI:     Jeyson Thomas is a 41 y.o. female who presents with cholecystitis. Past Medical History:   Diagnosis Date    Ectopic pregnancy        Past Surgical History:   Procedure Laterality Date    HX GYN      eptopic    HX OTHER SURGICAL      foot       History reviewed. No pertinent family history. Social History     Socioeconomic History    Marital status: SINGLE     Spouse name: Not on file    Number of children: Not on file    Years of education: Not on file    Highest education level: Not on file   Tobacco Use    Smoking status: Current Every Day Smoker     Packs/day: 0.25    Smokeless tobacco: Never Used   Substance and Sexual Activity    Alcohol use: No    Drug use: No       Prior to Admission medications    Not on File       Allergies   Allergen Reactions    Naproxen Hives    Pcn [Penicillins] Hives    Tramadol Hives       Review of Systems  A comprehensive review of systems was negative except for that written in the History of Present Illness. Physical Exam:      Visit Vitals  /84 (BP Patient Position: At rest)   Pulse (!) 103   Temp 98.7 °F (37.1 °C)   Resp 14   Ht 6' 0.01\" (1.829 m)   Wt 74.1 kg (163 lb 6.4 oz)   SpO2 98%   BMI 22.16 kg/m²       Physical Exam:  Pertinent items are noted in HPI. Labs Reviewed: All lab results for the last 24 hours reviewed. Assessment/Plan     Active Problems:    Acute cholecystitis (2/16/2021)      Cholecystitis (2/16/2021)        Plan lap anatoliy possible liver biopsy, see Dr. Mina Tineo and P, no changes.

## 2021-02-17 NOTE — ANESTHESIA PREPROCEDURE EVALUATION
Relevant Problems   No relevant active problems       Anesthetic History        Pertinent negatives: No PONV       Review of Systems / Medical History  Patient summary reviewed, nursing notes reviewed and pertinent labs reviewed    Pulmonary          Smoker (did not smoke today)    Pertinent negatives: No asthma     Neuro/Psych           Pertinent negatives: No seizures and CVA   Cardiovascular                Pertinent negatives: No hypertension and valvular problems/murmurs  Exercise tolerance: >4 METS     GI/Hepatic/Renal             Pertinent negatives: No GERD, liver disease and renal disease  Comments: cholecystitis Endo/Other          Pertinent negatives: No obesity   Other Findings            Physical Exam    Airway  Mallampati: II  TM Distance: 4 - 6 cm  Neck ROM: normal range of motion   Mouth opening: Normal     Cardiovascular    Rhythm: regular  Rate: normal         Dental  No notable dental hx       Pulmonary  Breath sounds clear to auscultation               Abdominal  GI exam deferred       Other Findings            Anesthetic Plan    ASA: 2  Anesthesia type: general          Induction: Intravenous  Anesthetic plan and risks discussed with: Patient      Plan GETA. Pt understands and accepts associated risks and agrees with plan. All questions answered. Consent signed.

## 2021-02-17 NOTE — ROUTINE PROCESS
Verbal shift change report given to Stacia CARRILLO by Brian Melgoza RN. Report included the following information SBAR, Kardex, OR Summary, Intake/Output and MAR.

## 2021-02-17 NOTE — PERIOP NOTES
[facility] made aware that SBAR is ready for review. Patient assigned room #309. Tori Foreman will be the nurse.

## 2021-02-18 LAB
ALBUMIN SERPL-MCNC: 2.7 G/DL (ref 3.4–5)
ALBUMIN/GLOB SERPL: 0.7 {RATIO} (ref 0.8–1.7)
ALP SERPL-CCNC: 90 U/L (ref 45–117)
ALT SERPL-CCNC: 36 U/L (ref 13–56)
ANION GAP SERPL CALC-SCNC: 5 MMOL/L (ref 3–18)
AST SERPL-CCNC: 43 U/L (ref 10–38)
BILIRUB SERPL-MCNC: 0.9 MG/DL (ref 0.2–1)
BUN SERPL-MCNC: 6 MG/DL (ref 7–18)
BUN/CREAT SERPL: 9 (ref 12–20)
CALCIUM SERPL-MCNC: 8.4 MG/DL (ref 8.5–10.1)
CHLORIDE SERPL-SCNC: 105 MMOL/L (ref 100–111)
CO2 SERPL-SCNC: 28 MMOL/L (ref 21–32)
CREAT SERPL-MCNC: 0.69 MG/DL (ref 0.6–1.3)
ERYTHROCYTE [DISTWIDTH] IN BLOOD BY AUTOMATED COUNT: 15.7 % (ref 11.6–14.5)
GLOBULIN SER CALC-MCNC: 4.1 G/DL (ref 2–4)
GLUCOSE SERPL-MCNC: 101 MG/DL (ref 74–99)
HCT VFR BLD AUTO: 31.1 % (ref 35–45)
HGB BLD-MCNC: 10.5 G/DL (ref 12–16)
MCH RBC QN AUTO: 26.5 PG (ref 24–34)
MCHC RBC AUTO-ENTMCNC: 33.8 G/DL (ref 31–37)
MCV RBC AUTO: 78.5 FL (ref 74–97)
PLATELET # BLD AUTO: 303 K/UL (ref 135–420)
PMV BLD AUTO: 8.9 FL (ref 9.2–11.8)
POTASSIUM SERPL-SCNC: 3.9 MMOL/L (ref 3.5–5.5)
PROT SERPL-MCNC: 6.8 G/DL (ref 6.4–8.2)
RBC # BLD AUTO: 3.96 M/UL (ref 4.2–5.3)
SODIUM SERPL-SCNC: 138 MMOL/L (ref 136–145)
WBC # BLD AUTO: 14.5 K/UL (ref 4.6–13.2)

## 2021-02-18 PROCEDURE — 65270000029 HC RM PRIVATE

## 2021-02-18 PROCEDURE — 74011250636 HC RX REV CODE- 250/636: Performed by: SURGERY

## 2021-02-18 PROCEDURE — 85027 COMPLETE CBC AUTOMATED: CPT

## 2021-02-18 PROCEDURE — 36415 COLL VENOUS BLD VENIPUNCTURE: CPT

## 2021-02-18 PROCEDURE — 80053 COMPREHEN METABOLIC PANEL: CPT

## 2021-02-18 PROCEDURE — 74011250637 HC RX REV CODE- 250/637: Performed by: SURGERY

## 2021-02-18 PROCEDURE — 77010033678 HC OXYGEN DAILY

## 2021-02-18 RX ORDER — METRONIDAZOLE 500 MG/1
500 TABLET ORAL 3 TIMES DAILY
Qty: 15 TAB | Refills: 0 | Status: SHIPPED | OUTPATIENT
Start: 2021-02-18

## 2021-02-18 RX ORDER — CIPROFLOXACIN 250 MG/1
500 TABLET, FILM COATED ORAL EVERY 12 HOURS
Qty: 10 TAB | Refills: 0 | Status: SHIPPED | OUTPATIENT
Start: 2021-02-18

## 2021-02-18 RX ORDER — ACETAMINOPHEN 325 MG/1
650 TABLET ORAL
Status: DISCONTINUED | OUTPATIENT
Start: 2021-02-18 | End: 2021-02-19 | Stop reason: HOSPADM

## 2021-02-18 RX ORDER — OXYCODONE AND ACETAMINOPHEN 5; 325 MG/1; MG/1
1 TABLET ORAL
Qty: 20 TAB | Refills: 0 | Status: SHIPPED | OUTPATIENT
Start: 2021-02-18 | End: 2021-02-23

## 2021-02-18 RX ADMIN — METRONIDAZOLE 500 MG: 500 INJECTION, SOLUTION INTRAVENOUS at 09:06

## 2021-02-18 RX ADMIN — CIPROFLOXACIN 400 MG: 2 INJECTION, SOLUTION INTRAVENOUS at 12:43

## 2021-02-18 RX ADMIN — ACETAMINOPHEN 650 MG: 325 TABLET ORAL at 09:53

## 2021-02-18 RX ADMIN — DEXTROSE MONOHYDRATE, SODIUM CHLORIDE, AND POTASSIUM CHLORIDE 125 ML/HR: 50; 4.5; 1.49 INJECTION, SOLUTION INTRAVENOUS at 02:41

## 2021-02-18 RX ADMIN — Medication 10 ML: at 16:51

## 2021-02-18 RX ADMIN — ACETAMINOPHEN 650 MG: 325 TABLET ORAL at 16:50

## 2021-02-18 RX ADMIN — DEXTROSE MONOHYDRATE, SODIUM CHLORIDE, AND POTASSIUM CHLORIDE 125 ML/HR: 50; 4.5; 1.49 INJECTION, SOLUTION INTRAVENOUS at 20:26

## 2021-02-18 RX ADMIN — ACETAMINOPHEN 650 MG: 325 TABLET ORAL at 05:40

## 2021-02-18 RX ADMIN — HYDROMORPHONE HYDROCHLORIDE 1 MG: 1 INJECTION, SOLUTION INTRAMUSCULAR; INTRAVENOUS; SUBCUTANEOUS at 09:06

## 2021-02-18 RX ADMIN — OXYCODONE AND ACETAMINOPHEN 1 TABLET: 5; 325 TABLET ORAL at 02:41

## 2021-02-18 RX ADMIN — OXYCODONE AND ACETAMINOPHEN 1 TABLET: 5; 325 TABLET ORAL at 20:26

## 2021-02-18 RX ADMIN — CIPROFLOXACIN 400 MG: 2 INJECTION, SOLUTION INTRAVENOUS at 22:40

## 2021-02-18 RX ADMIN — HYDROMORPHONE HYDROCHLORIDE 1 MG: 1 INJECTION, SOLUTION INTRAMUSCULAR; INTRAVENOUS; SUBCUTANEOUS at 17:34

## 2021-02-18 RX ADMIN — METRONIDAZOLE 500 MG: 500 INJECTION, SOLUTION INTRAVENOUS at 22:40

## 2021-02-18 NOTE — PROGRESS NOTES
Bedside and Verbal shift change report given to Eric Arellano RN  (oncoming nurse) by Essence BERMANN, RN (offgoing nurse). Report included the following information SBAR, Kardex and MAR. SHIFT UPDATES:  Patient presented with Cholecystitis upon admission and had Cholecystectomy procedure performed today. Patient presented stable and presented with IV Dilaudid 1 mg every 6 hours PRN severe pain. Patient last received IV Dilaudid 1 mg at 850 am prior to going to surgery. At 1500 pm, patient was transferred to PACU for surgery. Report was received from PACU around 1848 pm.  As of 1900 pm, patient is still awaiting to present back to unit from Surgery. Patient received IV Dilaudid 2 mg post surgical procedure according to PACU staff. Nightshift RN Staff will be notified to follow in accordance to patient's orders for care upon returning from PACU. ABNORMAL LAB:   Results for Celso Alamo (MRN [id number]) as of 2/17/2021 12:54   Ref. Range 1/22/2016 20:40 2/16/2021 04:15 2/16/2021 05:13 2/16/2021 06:15 2/17/2021 01:00   WBC Latest Ref Range: 4.6 - 13.2 K/uL  7.8      RBC Latest Ref Range: 4.20 - 5.30 M/uL  3.90 (L)      HGB Latest Ref Range: 12.0 - 16.0 g/dL  10.4 (L)      HCT Latest Ref Range: 35.0 - 45.0 %  31.8 (L)      MCV Latest Ref Range: 74.0 - 97.0 FL  81.5      MCH Latest Ref Range: 24.0 - 34.0 PG  26.7      MCHC Latest Ref Range: 31.0 - 37.0 g/dL  32.7      RDW Latest Ref Range: 11.6 - 14.5 %  15.6 (H)      PLATELET Latest Ref Range: 135 - 420 K/uL  333      MPV Latest Ref Range: 9.2 - 11.8 FL  9.2      NEUTROPHILS Latest Ref Range: 40 - 73 %  59      LYMPHOCYTES Latest Ref Range: 21 - 52 %  31      MONOCYTES Latest Ref Range: 3 - 10 %  8      EOSINOPHILS Latest Ref Range: 0 - 5 %  2      BASOPHILS Latest Ref Range: 0 - 2 %  0      DF Latest Units:    AUTOMATED      ABS. NEUTROPHILS Latest Ref Range: 1.8 - 8.0 K/UL  4.6      ABS. LYMPHOCYTES Latest Ref Range: 0.9 - 3.6 K/UL  2.4      ABS. MONOCYTES Latest Ref Range: 0.05 - 1.2 K/UL  0.6      ABS. EOSINOPHILS Latest Ref Range: 0.0 - 0.4 K/UL  0.2      ABS. BASOPHILS Latest Ref Range: 0.0 - 0.1 K/UL  0.0      Color Latest Units:      YELLOW    Appearance Latest Units:      CLEAR    Specific gravity Latest Ref Range: 1.005 - 1.030      1.027    pH (UA) Latest Ref Range: 5.0 - 8.0      6.5    Protein Latest Ref Range: NEG mg/dL    Negative    Glucose Latest Ref Range: NEG mg/dL    Negative    Ketone Latest Ref Range: NEG mg/dL    Negative    Blood Latest Ref Range: NEG      Negative    Bilirubin Latest Ref Range: NEG      Negative    Urobilinogen Latest Ref Range: 0.2 - 1.0 EU/dL    0.2    Nitrites Latest Ref Range: NEG      Negative    Leukocyte Esterase Latest Ref Range: NEG      Negative    Sodium Latest Ref Range: 136 - 145 mmol/L  142   136   Potassium Latest Ref Range: 3.5 - 5.5 mmol/L  3.4 (L)   4.4   Chloride Latest Ref Range: 100 - 111 mmol/L  106   102   CO2 Latest Ref Range: 21 - 32 mmol/L  30   27   Anion gap Latest Ref Range: 3.0 - 18 mmol/L  6   7   Glucose Latest Ref Range: 74 - 99 mg/dL  107 (H)   129 (H)   BUN Latest Ref Range: 7.0 - 18 MG/DL  8   6 (L)   Creatinine Latest Ref Range: 0.6 - 1.3 MG/DL  0.78   0.93   BUN/Creatinine ratio Latest Ref Range: 12 - 20    10 (L)   6 (L)   Calcium Latest Ref Range: 8.5 - 10.1 MG/DL  8.5   8.9   GFR est non-AA Latest Ref Range: >60 ml/min/1.73m2  >60   >60   GFR est AA Latest Ref Range: >60 ml/min/1.73m2  >60   >60   Bilirubin, total Latest Ref Range: 0.2 - 1.0 MG/DL  0.2   0.7   Protein, total Latest Ref Range: 6.4 - 8.2 g/dL  7.7   7.4   Albumin Latest Ref Range: 3.4 - 5.0 g/dL  3.4   3.2 (L)   Globulin Latest Ref Range: 2.0 - 4.0 g/dL  4.3 (H)   4.2 (H)   A-G Ratio Latest Ref Range: 0.8 - 1.7    0.8   0.8   ALT Latest Ref Range: 13 - 56 U/L  23   23   AST Latest Ref Range: 10 - 38 U/L  16   14   Alk.  phosphatase Latest Ref Range: 45 - 117 U/L  99   98   Lipase Latest Ref Range: 73 - 393 U/L  36 (L)      HCG, Ql. Latest Ref Range: NEG    Negative      CT ABD PELV W CONT Unknown   Rpt       Wounds? None. Central Lines? None. Last BM:  2/16/2021       Pending Labs for AM Draw: None. Discharge plan:  As of 2/16/2021 case management note as of 1040 am states that patient will discharge home with family assistance once medically stable.        Homer Herron  2/17/2021  7:00 PM

## 2021-02-18 NOTE — DISCHARGE INSTRUCTIONS
Post-Operative Discharge Instructions  Taisha Stephenson M.D.  [address] [place] [address]  [phone]    Patient: Edda Friedman MRN: [id number]  [facility]: [id number]    YOB: 1975  Age: 41 y.o. Sex: female    DOA: 2021 LOS:  LOS: 2 days   Discharge Date:      Acute Diagnoses:  Acute cholecystitis [K81.0]  Cholecystitis [K81.9]    Chronic Medical Diagnoses:  Problem List as of 2021 Never Reviewed          Codes Class Noted - Resolved    Acute cholecystitis ICD-10-CM: K81.0  ICD-9-CM: 575.0  2021 - Present        Cholecystitis ICD-10-CM: K81.9  ICD-9-CM: 575.10  2021 - Present              Diet  1. Resume prior to surgery diet as tolerated. Activity  1. Do not drive a car or operate any hazardous machinery the day of surgery. 2. Rest quietly today. 3. No bending or heavy lifting. 4. You may resume other prior to surgery activities as tolerated. 5. You may remove the bandage and shower in 1 day. Drain / Wound Care  1. Follow all drain / wound care instructions exactly as explained by the Nurse at time of discharge. 2. Apply an ice pack to the surgical site for 48 hours. 3. Do not put any salves or ointments on the wound. Allow it to form a dry scab. 4. Leave steri-strips / Dermabond alone. They should be allowed to fall off on their own in 7-14 days. Medications  1. It is important to take your medications exactly as they are prescribed. 2. Keep your medication in the bottles provided by the pharmacist, and keep a list of the medication names, dosages, and times they should be taken in your wallet. Call 911 anytime you think you may need emergency care. For example, call if:  · You passed out (lost consciousness). · You have severe trouble breathing. · You have sudden chest pain and shortness of breath. Notify your Surgeon for any of the followin.  Fever, chills, nausea, vomiting, severe abdominal pain or bleeding. 2. If you experience any redness or discharge or sign of infection. 3. Persistent nausea lasting more than 24 hours. If you are unable to reach your Surgeon for any of the symptoms above, you should proceed directly to the nearest Emergency Department. Post-Operative Appointment Information    Call Dr. Carlos Eduardo Leonard office tomorrow morning at [phone] to schedule a post-operative office visit in one (1) week. If any questions or concerns arise, call your Surgeon at [phone].

## 2021-02-18 NOTE — PERIOP NOTES
Visit Vitals  /82   Pulse 78   Temp 98.6 °F (37 °C)   Resp 22   Ht 6' 0.01\" (1.829 m)   Wt 74.1 kg (163 lb 6.4 oz)   LMP 02/12/2021   SpO2 100%   BMI 22.16 kg/m²       Intake/Output Summary (Last 24 hours) at 2/17/2021 1903  Last data filed at 2/17/2021 1845  Gross per 24 hour   Intake 2200 ml   Output    Net 2200 ml

## 2021-02-18 NOTE — PROGRESS NOTES
Bedside and verbal shift change report given to Deondre Colorado, RN (on coming nurse) by Delisa Lyons, RN (off going nurse). Report included the following information SBAR, Kardex, OR Summary, Intake/Output and MAR.    3944 Notify Dr Trace Briseno about pt temp 100.9. Telephone orders received with verbal read back  Labs to be collected, CBC with out diff and CMP. Cancel pt d/c.    2495 Administer tylenol 650 for temp. 1130 Reassess pt temp, 98.3. Shift summary:  Pt is still running a temp. Temp 101 PRN tylenol administered. Try to assist pt with IS and pt refuse teaching and stated that \" I know how to us it. \"  Try to educated pt that the IS helps to prevent pneumonia, pt continues to sleep. Pt refuse to get up out of bed and use bathroom. 1945 Bedside and verbal shift change report given by Deondre Colorado, RN (off going nurse) to Delisa Lyons , RN(on coming nurse). Report included the following information SBAR, Kardex, OR Summary, Intake/Output and MAR.

## 2021-02-18 NOTE — PERIOP NOTES
TRANSFER - OUT REPORT:    Verbal report given to Tasha Hameed RN on Citlaly Ponce  being transferred to [facility] for routine post - op       Report consisted of patients Situation, Background, Assessment and   Recommendations(SBAR). Information from the following report(s) SBAR, Procedure Summary, Intake/Output and MAR was reviewed with the receiving nurse. Lines:   Peripheral IV 02/16/21 Right Antecubital (Active)   Site Assessment Clean, dry, & intact 02/17/21 1845   Phlebitis Assessment 0 02/17/21 1845   Infiltration Assessment 0 02/17/21 1845   Dressing Status Clean, dry, & intact 02/17/21 1845   Dressing Type Tape;Transparent 02/17/21 1845   Hub Color/Line Status Pink; Infusing;Patent 02/17/21 1845   Action Taken Open ports on tubing capped 02/16/21 2059   Alcohol Cap Used Yes 02/17/21 0855        Opportunity for questions and clarification was provided.       Patient transported with:   O2 @ 2 liters  Registered Nurse  [facility]

## 2021-02-18 NOTE — PROGRESS NOTES
Care manager note: discussed patient in IDRs today; patient's temperature 101.5 has placed discharge on hold for today for further evaluation; care manager will continue to follow for discharge needs.

## 2021-02-18 NOTE — PROGRESS NOTES
02/17/21 1912   Vital Signs   Temp 98.8 °F (37.1 °C)   Temp Source Temporal   Pulse (Heart Rate) 84   Resp Rate 20   O2 Sat (%) 100 %   BP (!) 150/89   BP 1 Method Automatic   BP 1 Location Left upper arm   BP Patient Position At rest   Pain 1   Pain Scale 1 Numeric (0 - 10)   Pain Intensity 1 9   Patient Stated Pain Goal 0   Pain Onset 1 acute/surgical   Pain Location 1 Abdomen   Pain Orientation 1 Mid   Pain Description 1 Aching   Pain Intervention(s) 1 Medication (see MAR)   Oxygen Therapy   O2 Device Nasal cannula   O2 Flow Rate (L/min) 2 l/min   S: Handoff report received. B: Patient had Cholecystectomy procedure laparoscopically with liver biopsy. At [facility], PACU nurse Clint Ellis verbalized that patient had 4 mid-abdominal trocar sites to abdomen with Skin glue and open to air. Additionally, Nurse Cindi Garcia stated that patient hand minimal estimated blood loss post procedure and received a total of 2300 cc Lactated Ringer post surgical procedure. Nurse Cindi Garcia stated that patient received 2 mg IV dilaudid post surgical procedure and presented with the following vital signs (P: 77 BPM RR:18 BP: 127/85 SPO2: 100% on 2 liters nasal cannula). A: At 1912 Pm patient was brought to unit via bed by PACU to room 309 and presented with the following vital signs listed above. PACU nurse Clint Ellis RN acted as secondary nurse for skin assessment upon visualization of 4 midabdominal trocar sites. At 1917 pm, patient was given IV Dilaudid 1 mg for severe pain and started on IV Dextrose 5% with 0.45% Normal Saline with 20 MEQ KCL at 125 cc/hr via patient's right antecubital 20 gauge IV. Nightshift RN staff will be notified to follow up in accordance to sign and held orders for patient. R: will continue with prescribed plan of care as directed.    Homer Herron  2/17/2021   7:19 PM

## 2021-02-18 NOTE — PERIOP NOTES
Primary Nurse Sanjuana Chavez RN and Rosa Gracia RN performed a dual skin assessment on this patient No impairment noted.

## 2021-02-18 NOTE — PROGRESS NOTES
Progress Note    Patient: Torsten Lamb MRN: [id number]  CSN: [id number]    YOB: 1975  Age: 41 y.o. Sex: female    DOA: 2/16/2021 LOS:  LOS: 2 days                    Subjective:     Patient states feels better today, low grade temp. .    Objective:      Visit Vitals  /89 (BP 1 Location: Right upper arm, BP Patient Position: At rest)   Pulse 82   Temp (!) 100.9 °F (38.3 °C)   Resp 20   Ht 6' 0.01\" (1.829 m)   Wt 78.9 kg (174 lb)   SpO2 100%   BMI 23.59 kg/m²       Physical Exam:  General appearance: Alert, no distress  Lungs: clear to auscultation bilaterally  Heart: regular rate and rhythm, S1, S2 normal, no murmur, click, rub or gallop  Abdomen: soft, appropriate tenderness, non distended  Extremities: extremities normal, atraumatic, no cyanosis or edema, calfs non-tender  Skin: Skin color, texture, turgor normal. No rashes or lesions  Psych: Alert, oriented x3, appropriate    Intake and Output:  Current Shift:  No intake/output data recorded. Last three shifts:  02/16 1901 - 02/18 0700  In: 3500 [I.V.:3500]  Out: -     Lab/Data Reviewed: All lab results for the last 24 hours reviewed. Medications Reviewed. Assessment/Plan     Active Problems:    Acute cholecystitis (2/16/2021)      Cholecystitis (2/16/2021)        Temp possibly atelectasis, will send home om oral ABX.

## 2021-02-18 NOTE — WOUND CARE
Chart audited for low barak score, patient with high risk for skin breakdown, no documented wounds at time of audit. Skin Care & Pressure Relief Recommendations Minimize layers of linen Pads under patient to optimize support surface Turn/reposition approximately every 2 hours Pillow wedges Manage incontinence Promote continence; Skin Protective lotion/cream to buttocks and sacrum daily and as needed with incontinence care Offload heels pillows Consult wound care if any wounds/ skin care needs noted during admission

## 2021-02-18 NOTE — PROGRESS NOTES
Noted patient for discharge today, s/p slick mcguire 2/17; will need to call Dr. Iron Cochran for follow up appt and also Dr. Dottie Yoder office - they schedule their own appointments.

## 2021-02-18 NOTE — PROGRESS NOTES
Pt sleeping, easily awakened, did not want to be assessed at this time. Encouraged use of IS with teaching done. 0116 Pt incontinent of large amount of urine, linens and diaper saturated/soaked. Up to side of bed with supervision. Linens, diaper and gown changed    0515 MD notified of elevated temp, ordered for Tylenol prn. Reminded pt to use IS, not using as recommended    0540 Tylenol given for Temp of 101.8    0650 Temp of 99.1 axillary, pt sleeping, easily awakened    0750 Bedside and Verbal shift change report given to Flaquito Velasquez RN (oncoming nurse) by   Ariana Colvin RN   (offgoing nurse). Report included the following information SBAR, Kardex, Intake/Output, MAR and Recent Results.

## 2021-02-19 VITALS
RESPIRATION RATE: 18 BRPM | BODY MASS INDEX: 21.88 KG/M2 | HEIGHT: 72 IN | WEIGHT: 161.5 LBS | TEMPERATURE: 99.2 F | DIASTOLIC BLOOD PRESSURE: 67 MMHG | HEART RATE: 68 BPM | SYSTOLIC BLOOD PRESSURE: 101 MMHG | OXYGEN SATURATION: 100 %

## 2021-02-19 PROCEDURE — 74011250636 HC RX REV CODE- 250/636: Performed by: SURGERY

## 2021-02-19 PROCEDURE — 74011250637 HC RX REV CODE- 250/637: Performed by: SURGERY

## 2021-02-19 RX ADMIN — OXYCODONE AND ACETAMINOPHEN 1 TABLET: 5; 325 TABLET ORAL at 09:11

## 2021-02-19 RX ADMIN — DEXTROSE MONOHYDRATE, SODIUM CHLORIDE, AND POTASSIUM CHLORIDE 125 ML/HR: 50; 4.5; 1.49 INJECTION, SOLUTION INTRAVENOUS at 09:11

## 2021-02-19 RX ADMIN — DEXTROSE MONOHYDRATE, SODIUM CHLORIDE, AND POTASSIUM CHLORIDE 125 ML/HR: 50; 4.5; 1.49 INJECTION, SOLUTION INTRAVENOUS at 04:53

## 2021-02-19 RX ADMIN — METRONIDAZOLE 500 MG: 500 INJECTION, SOLUTION INTRAVENOUS at 09:11

## 2021-02-19 RX ADMIN — OXYCODONE AND ACETAMINOPHEN 1 TABLET: 5; 325 TABLET ORAL at 00:21

## 2021-02-19 RX ADMIN — CIPROFLOXACIN 400 MG: 2 INJECTION, SOLUTION INTRAVENOUS at 12:13

## 2021-02-19 RX ADMIN — OXYCODONE AND ACETAMINOPHEN 1 TABLET: 5; 325 TABLET ORAL at 04:44

## 2021-02-19 NOTE — PROGRESS NOTES
Pt refused to have VS taken at start of shift. Incontinent of urine at this time. Zora care done by pt, diaper applied and gown changed after CHG wipes done, bed linens changed. Reminded to use IS, pt acknowledged. SCD's on. Pt refused offer of bedpan, BSC or female external cath. 0400 Pt incontinent but able to clean self up. VS WDL at this time. 0730 Verbal shift change report given to Flavio Hoffman RN (oncoming nurse) by Rosette Pimentel RN   (offgoing nurse). Report included the following information SBAR, Kardex, Intake/Output, MAR and Recent Results.

## 2021-02-19 NOTE — DISCHARGE SUMMARY
Physician Discharge Summary     Patient ID:  Sloane Curiel  [id number]  41 y.o.  1975    Allergies: Naproxen, Pcn [penicillins], and Tramadol    Admit Date: 2/16/2021    Discharge Date: 2/19/2021    * Admission Diagnoses: Acute cholecystitis [K81.0]  Cholecystitis [K81.9]    * Discharge Diagnoses:    Hospital Problems as of 2/19/2021 Never Reviewed          Codes Class Noted - Resolved POA    Acute cholecystitis ICD-10-CM: K81.0  ICD-9-CM: 575.0  2/16/2021 - Present Unknown        Cholecystitis ICD-10-CM: K81.9  ICD-9-CM: 575.10  2/16/2021 - Present Unknown               Admission Condition: Poor    * Discharge Condition: improved    * Procedures: Procedure(s):  CHOLECYSTECTOMY LAPAROSCOPIC AND LIVER BIOPSY (PT IN ROOM #309)    * Hospital Course:   Normal hospital course for this procedure. Consults: None    Significant Diagnostic Studies: CT, labs    * Disposition: Home    Discharge Medications:   Current Discharge Medication List      START taking these medications    Details   ciprofloxacin HCl (CIPRO) 250 mg tablet Take 2 Tabs by mouth every twelve (12) hours. Qty: 10 Tab, Refills: 0      metroNIDAZOLE (FLAGYL) 500 mg tablet Take 1 Tab by mouth three (3) times daily. Qty: 15 Tab, Refills: 0      oxyCODONE-acetaminophen (PERCOCET) 5-325 mg per tablet Take 1 Tab by mouth every six (6) hours as needed for Pain for up to 5 days. Max Daily Amount: 4 Tabs. Qty: 20 Tab, Refills: 0    Associated Diagnoses: Acute cholecystitis             * Follow-up Care/Patient Instructions: Activity: No lifting or Strenuous exercise for 2 weeks  Diet: Low fat, Low cholesterol  Wound Care: None needed (pt may shower 48 hours postop)    Follow-up Information     Follow up With Specialties Details Why [id number] contacted MD's office (Dr. Carrie Hilario) on 2/17/2021 to assist with scheduling f/u appointment for patient.   Per MD's office, patient must contact office to arrange f/u appointment date/time for new patients. None    None ([phone]) Patient stated that they have no PCP      Bertha Grady MD General Surgery  Care Mgmt submitted request on 2/18/2021 to MD's office (Dr. Sherryle Purl) for f/u appointment. Patient to call MD's office upon discharge to confirm f/u appointment date/time.  [address] [facility]  [phone]          Follow-up tests/labs none    Signed:  Concha Hahn MD  2/19/2021  10:18 AM

## 2021-02-19 NOTE — OP NOTES
OPERATIVE NOTE    Patient: Tarun Goddard MRN: [id number]  CSN: [id number]    YOB: 1975  Age: 41 y.o. Sex: female      Indications: This is a 41y.o. year-old female who presents with gallstones. Date of Procedure: 2/17/2021     Preoperative Diagnosis: Cholelithiasis. Postoperative Diagnosis: Cholelithiasis. Acute cholecystitis, fatty liver    Procedure: Procedure(s):  CHOLECYSTECTOMY LAPAROSCOPIC AND LIVER BIOPSY (PT IN ROOM #309)    Surgeon(s): Surgeon(s) and Role:     * Shauna Stephenson MD - Primary    Anesthesia: General     Assistant:  Surg Asst-1: Ebb Dave    Implants:  * No implants in log *    Procedure: The patient was placed in the supine position. Sedation was achieved by anesthesia. Her abdomen was prepped and draped in the usual fashion. An incision was made over the umbilicus with a blade and a Veress needle was inserted using 1-drop technique. A 5-mm Optiview trocar was placed under visualization. A 12-mm port was placed in the upper abdomen, and two 5-mm ports placed on the right side of the abdomen. The gallbladder was visualized and was very distended and was aspirated, the liver was fatty . The remainder of the abdominal examination was unremarkable. The gallbladder was retracted laterally and superiorly, exposing the infundibulum. The infundibulum was dissected down to the cystic duct and cystic artery carefully. Both of these structures were identified and confirmed with critical view, they were ligated and divided using metal clips and laparoscopic scissors. The gallbladder was removed from the gallbladder fossa using the J-hook electrocautery. A wedge liver biopsy was done with the j hook. The gallbladder was removed through the top trocar port without difficulty. Adequate hemostasis was seen. All ports were removed. The 12 mm trocar fascia was closed with a 0 PDS. Skin was closed with 4-0 Monocryl in subcuticular closure and Dermabond.       The patient was extubated and transferred to the recovery room in stable condition. Estimated Blood Loss: * No values recorded between 2/17/2021  4:17 PM and 2/17/2021  5:23 PM *    Specimens:   ID Type Source Tests Collected by Time Destination   1 : GALLBLADDER AND CONTENTS Preservative Gallbladder  Shaji Canseco MD 2/17/2021 1622 Pathology   2 : LIVER BIOPSY Preservative Liver specimen  Shaji Canseco MD 2/17/2021 1652 Pathology        Drains:  Help Text   This SmartLink retrieves the last documented value for [facility] assessment data, retrieved by either LDA type or by LDA group ID. This SmartLink should be used in a SmartText or SmartPhrase. If one is not available, please contact your . Complications: None; the patient tolerated the procedure well.     Frannie Otto MD  2/19/2021  2:59 PM

## 2021-02-19 NOTE — PROGRESS NOTES
02/19/21 1210   Vital Signs   Temp 99.2 °F (37.3 °C)   Temp Source Oral   Pulse (Heart Rate) 68   Heart Rate Source Monitor   Resp Rate 18   O2 Sat (%) 100 %   Level of Consciousness Alert   /67   MAP (Calculated) 78   MEWS Score 1   Pain 1   Pain Scale 1 Numeric (0 - 10)   Pain Intensity 1 0   Patient Stated Pain Goal 0   S: Patient discharge completed. B: Patient presented with discharge orders home and patient was notified. Patient gathered patient belongings and dressed herself independently. Patient's Right antecubital 22 gauge IV catheter was removed prior to discharge. A: Patient presented awake, alert and responsive with the most reasonable vital signs listed above. Patient was provided discharge education, instructions and notified of discharge follow up appointments and discharge prescriptions. Patient verbalized understanding to all discharge education, instructions and notifications provided. Patient was instructed to notify nursing staff once her discharge vehicle presented to front entrance so that she could be taken off unit via wheelchair to front entrance. Understanding was verbalized and Charge RN Elvia Lesches was notified of patient discharge home. R: Will continue with prescribe plan of care as directed.    Homer Herron  2/19/2021  12:11 PM

## 2021-02-19 NOTE — PROGRESS NOTES
Noted pt is s/p albert cope earlier this 2/17/21. Anticipate pt will transition home with in the next 24-48 hours with physician follow up. Please encourage ambulation to assist with identifying potential transition of care needs. CM remains available. Care Management Interventions  Mode of Transport at Discharge:  Other (see comment)(Family)  Transition of Care Consult (CM Consult): Discharge Planning  Health Maintenance Reviewed: Yes  Current Support Network: Family Lives Nearby  The Plan for Transition of Care is Related to the Following Treatment Goals : Home with physician follow up   Discharge Location  Discharge Placement: Home with family assistance

## 2021-02-19 NOTE — PROGRESS NOTES
POD#2  Pt feeling much better. Zohra PO.   No n/v. +Flatus  Afeb x 24 hours VSS    RRR  CTA  S/ND, +BS, incisions c/d/i  No calf tenderness    Stable s/p lap anatoliy  Fever resolved - likely atelectasis - OOB/spirometer  Low fat diet  Abx changed to PO  D/C home

## 2022-03-17 NOTE — PROGRESS NOTES
Pharmacy Dosing - Metronidazole    Erlin Guzman is a 41 y.o. yo female prescribed metronidazole for Intra-Abdominal Infection    Ordered Dose: Flagyl 500 mg IV q8h    Dose adjusted to 500 mg IV/PO every 12 hours per P&T protocol     Every 12 hours dosing NOT indicated for C.  Difficile, CNS or non-anaerobic infections Dermal Autograft Text: The defect edges were debeveled with a #15 scalpel blade.  Given the location of the defect, shape of the defect and the proximity to free margins a dermal autograft was deemed most appropriate.  Using a sterile surgical marker, the primary defect shape was transferred to the donor site. The area thus outlined was incised deep to adipose tissue with a #15 scalpel blade.  The harvested graft was then trimmed of adipose and epidermal tissue until only dermis was left.  The skin graft was then placed in the primary defect and oriented appropriately.

## (undated) DEVICE — TISSUE RETRIEVAL SYSTEM: Brand: INZII RETRIEVAL SYSTEM

## (undated) DEVICE — APPLICATOR SURG XL L38CM FOR ARISTA ABSRB HEMSTAT FLEXITIP

## (undated) DEVICE — APPLIER CLP L SHFT DIA12MM 20 ROT MULT LIGACLP

## (undated) DEVICE — AGENT HEMSTAT 3GM PURIFIED PLNT STARCH PWD ABSRB ARISTA AH

## (undated) DEVICE — DERMABOND SKIN ADH 0.7ML --

## (undated) DEVICE — SOLUTION LACTATED RINGERS INJECTION USP

## (undated) DEVICE — APPLIER CLP M L L11.4IN DIA10MM ENDOSCP ROT MULT FOR LIG

## (undated) DEVICE — INSUFFLATION NEEDLE TO ESTABLISH PNEUMOPERITONEUM.: Brand: INSUFFLATION NEEDLE

## (undated) DEVICE — STERILE POLYISOPRENE POWDER-FREE SURGICAL GLOVES: Brand: PROTEXIS

## (undated) DEVICE — SCISSORS ENDOSCP DIA5MM CRV MPLR CAUT W/ RATCH HNDL

## (undated) DEVICE — E-Z CLEAN, PTFE COATED, ELECTROSURGICAL LAPAROSCOPIC ELECTRODE, J-HOOK, 33 CM., SINGLE-USE, FOR USE WITH HAND CONTROL PENCIL: Brand: MEGADYNE

## (undated) DEVICE — VISUALIZATION SYSTEM: Brand: CLEARIFY

## (undated) DEVICE — SUT MONOCRYL PLUS UD 4-0 --

## (undated) DEVICE — STERILE POLYISOPRENE POWDER-FREE SURGICAL GLOVES WITH EMOLLIENT COATING: Brand: PROTEXIS

## (undated) DEVICE — [HIGH FLOW INSUFFLATOR,  DO NOT USE IF PACKAGE IS DAMAGED,  KEEP DRY,  KEEP AWAY FROM SUNLIGHT,  PROTECT FROM HEAT AND RADIOACTIVE SOURCES.]: Brand: PNEUMOSURE

## (undated) DEVICE — SUT VCRL + 0 36IN UR6 VIO --

## (undated) DEVICE — LAP CHOLE: Brand: MEDLINE INDUSTRIES, INC.

## (undated) DEVICE — DISPOSABLE SUCTION/IRRIGATOR TUBE SET WITH TIP: Brand: AHTO

## (undated) DEVICE — PAD PT POS 36 IN SURGYPAD DISP

## (undated) DEVICE — LAPAROSCOPIC TROCAR SLEEVE/SINGLE USE: Brand: KII® OPTICAL ACCESS SYSTEM

## (undated) DEVICE — SUTURE PDS II SZ 0 L27IN ABSRB VLT L36MM CT-1 1/2 CIR Z340H

## (undated) DEVICE — GARMENT,MEDLINE,DVT,INT,CALF,MED, GEN2: Brand: MEDLINE

## (undated) DEVICE — TROCAR: Brand: KII® OPTICAL ACCESS SYSTEM

## (undated) DEVICE — TROCAR: Brand: KII® SLEEVE